# Patient Record
Sex: MALE | Race: OTHER | NOT HISPANIC OR LATINO | URBAN - METROPOLITAN AREA
[De-identification: names, ages, dates, MRNs, and addresses within clinical notes are randomized per-mention and may not be internally consistent; named-entity substitution may affect disease eponyms.]

---

## 2018-12-07 ENCOUNTER — INPATIENT (INPATIENT)
Facility: HOSPITAL | Age: 53
LOS: 3 days | Discharge: HOME | DRG: 378 | End: 2018-12-11
Attending: FAMILY MEDICINE | Admitting: FAMILY MEDICINE
Payer: MEDICAID

## 2018-12-07 VITALS
SYSTOLIC BLOOD PRESSURE: 142 MMHG | WEIGHT: 177.91 LBS | HEIGHT: 70 IN | HEART RATE: 105 BPM | DIASTOLIC BLOOD PRESSURE: 83 MMHG | TEMPERATURE: 98 F | RESPIRATION RATE: 16 BRPM | OXYGEN SATURATION: 99 %

## 2018-12-07 DIAGNOSIS — K21.9 GASTRO-ESOPHAGEAL REFLUX DISEASE WITHOUT ESOPHAGITIS: ICD-10-CM

## 2018-12-07 DIAGNOSIS — R06.02 SHORTNESS OF BREATH: ICD-10-CM

## 2018-12-07 DIAGNOSIS — K92.2 GASTROINTESTINAL HEMORRHAGE, UNSPECIFIED: ICD-10-CM

## 2018-12-07 DIAGNOSIS — D50.0 IRON DEFICIENCY ANEMIA SECONDARY TO BLOOD LOSS (CHRONIC): ICD-10-CM

## 2018-12-07 DIAGNOSIS — D64.9 ANEMIA, UNSPECIFIED: ICD-10-CM

## 2018-12-07 LAB
ABO RH CONFIRMATION: SIGNIFICANT CHANGE UP
ALBUMIN SERPL ELPH-MCNC: 3.6 G/DL — SIGNIFICANT CHANGE UP (ref 3.3–5)
ALP SERPL-CCNC: 62 U/L — SIGNIFICANT CHANGE UP (ref 40–120)
ALT FLD-CCNC: 14 U/L — SIGNIFICANT CHANGE UP (ref 12–78)
ANION GAP SERPL CALC-SCNC: 7 MMOL/L — SIGNIFICANT CHANGE UP (ref 5–17)
APTT BLD: 25.9 SEC — LOW (ref 27.5–36.3)
AST SERPL-CCNC: 11 U/L — LOW (ref 15–37)
BASOPHILS # BLD AUTO: 0.03 K/UL — SIGNIFICANT CHANGE UP (ref 0–0.2)
BASOPHILS NFR BLD AUTO: 0.5 % — SIGNIFICANT CHANGE UP (ref 0–2)
BILIRUB SERPL-MCNC: 0.3 MG/DL — SIGNIFICANT CHANGE UP (ref 0.2–1.2)
BUN SERPL-MCNC: 15 MG/DL — SIGNIFICANT CHANGE UP (ref 7–23)
CALCIUM SERPL-MCNC: 8.1 MG/DL — LOW (ref 8.5–10.1)
CHLORIDE SERPL-SCNC: 106 MMOL/L — SIGNIFICANT CHANGE UP (ref 96–108)
CO2 SERPL-SCNC: 27 MMOL/L — SIGNIFICANT CHANGE UP (ref 22–31)
CREAT SERPL-MCNC: 0.95 MG/DL — SIGNIFICANT CHANGE UP (ref 0.5–1.3)
EOSINOPHIL # BLD AUTO: 0.14 K/UL — SIGNIFICANT CHANGE UP (ref 0–0.5)
EOSINOPHIL NFR BLD AUTO: 2.5 % — SIGNIFICANT CHANGE UP (ref 0–6)
GLUCOSE SERPL-MCNC: 88 MG/DL — SIGNIFICANT CHANGE UP (ref 70–99)
HCT VFR BLD CALC: 22.2 % — LOW (ref 39–50)
HGB BLD-MCNC: 5.7 G/DL — CRITICAL LOW (ref 13–17)
IMM GRANULOCYTES NFR BLD AUTO: 0.4 % — SIGNIFICANT CHANGE UP (ref 0–1.5)
INR BLD: 1.14 RATIO — SIGNIFICANT CHANGE UP (ref 0.88–1.16)
LYMPHOCYTES # BLD AUTO: 1.41 K/UL — SIGNIFICANT CHANGE UP (ref 1–3.3)
LYMPHOCYTES # BLD AUTO: 25.5 % — SIGNIFICANT CHANGE UP (ref 13–44)
MCHC RBC-ENTMCNC: 14.1 PG — LOW (ref 27–34)
MCHC RBC-ENTMCNC: 25.7 GM/DL — LOW (ref 32–36)
MCV RBC AUTO: 55.1 FL — LOW (ref 80–100)
MONOCYTES # BLD AUTO: 0.51 K/UL — SIGNIFICANT CHANGE UP (ref 0–0.9)
MONOCYTES NFR BLD AUTO: 9.2 % — SIGNIFICANT CHANGE UP (ref 2–14)
NEUTROPHILS # BLD AUTO: 3.43 K/UL — SIGNIFICANT CHANGE UP (ref 1.8–7.4)
NEUTROPHILS NFR BLD AUTO: 61.9 % — SIGNIFICANT CHANGE UP (ref 43–77)
OB PNL STL: NEGATIVE — SIGNIFICANT CHANGE UP
PLATELET # BLD AUTO: 378 K/UL — SIGNIFICANT CHANGE UP (ref 150–400)
POTASSIUM SERPL-MCNC: 3.9 MMOL/L — SIGNIFICANT CHANGE UP (ref 3.5–5.3)
POTASSIUM SERPL-SCNC: 3.9 MMOL/L — SIGNIFICANT CHANGE UP (ref 3.5–5.3)
PROT SERPL-MCNC: 7 G/DL — SIGNIFICANT CHANGE UP (ref 6–8.3)
PROTHROM AB SERPL-ACNC: 12.9 SEC — SIGNIFICANT CHANGE UP (ref 10–12.9)
RBC # BLD: 4.03 M/UL — LOW (ref 4.2–5.8)
RBC # FLD: 21.5 % — HIGH (ref 10.3–14.5)
SODIUM SERPL-SCNC: 140 MMOL/L — SIGNIFICANT CHANGE UP (ref 135–145)
WBC # BLD: 5.54 K/UL — SIGNIFICANT CHANGE UP (ref 3.8–10.5)
WBC # FLD AUTO: 5.54 K/UL — SIGNIFICANT CHANGE UP (ref 3.8–10.5)

## 2018-12-07 PROCEDURE — 71045 X-RAY EXAM CHEST 1 VIEW: CPT | Mod: 26

## 2018-12-07 PROCEDURE — 99285 EMERGENCY DEPT VISIT HI MDM: CPT

## 2018-12-07 PROCEDURE — 93010 ELECTROCARDIOGRAM REPORT: CPT

## 2018-12-07 PROCEDURE — 99223 1ST HOSP IP/OBS HIGH 75: CPT | Mod: AI

## 2018-12-07 RX ORDER — SODIUM CHLORIDE 9 MG/ML
1000 INJECTION INTRAMUSCULAR; INTRAVENOUS; SUBCUTANEOUS
Qty: 0 | Refills: 0 | Status: DISCONTINUED | OUTPATIENT
Start: 2018-12-07 | End: 2018-12-09

## 2018-12-07 RX ORDER — OCTREOTIDE ACETATE 200 UG/ML
10 INJECTION, SOLUTION INTRAVENOUS; SUBCUTANEOUS
Qty: 500 | Refills: 0 | Status: DISCONTINUED | OUTPATIENT
Start: 2018-12-07 | End: 2018-12-10

## 2018-12-07 RX ORDER — SODIUM CHLORIDE 9 MG/ML
1000 INJECTION INTRAMUSCULAR; INTRAVENOUS; SUBCUTANEOUS ONCE
Qty: 0 | Refills: 0 | Status: COMPLETED | OUTPATIENT
Start: 2018-12-07 | End: 2018-12-07

## 2018-12-07 RX ORDER — PANTOPRAZOLE SODIUM 20 MG/1
8 TABLET, DELAYED RELEASE ORAL
Qty: 80 | Refills: 0 | Status: DISCONTINUED | OUTPATIENT
Start: 2018-12-07 | End: 2018-12-10

## 2018-12-07 RX ORDER — HYDROCORTISONE 1 %
1 OINTMENT (GRAM) TOPICAL DAILY
Qty: 0 | Refills: 0 | Status: DISCONTINUED | OUTPATIENT
Start: 2018-12-07 | End: 2018-12-11

## 2018-12-07 RX ADMIN — PANTOPRAZOLE SODIUM 10 MG/HR: 20 TABLET, DELAYED RELEASE ORAL at 18:54

## 2018-12-07 RX ADMIN — SODIUM CHLORIDE 75 MILLILITER(S): 9 INJECTION INTRAMUSCULAR; INTRAVENOUS; SUBCUTANEOUS at 20:09

## 2018-12-07 RX ADMIN — SODIUM CHLORIDE 1000 MILLILITER(S): 9 INJECTION INTRAMUSCULAR; INTRAVENOUS; SUBCUTANEOUS at 14:45

## 2018-12-07 NOTE — H&P ADULT - ASSESSMENT
54 y/o m with unremarkable pmh except for hemorrhoids p/w 1-2 month hx of having weakness and shob and buring on eating spicy food and was found ot have Hb of 5.6 with no colonoscopy and endo in past

## 2018-12-07 NOTE — ED PROVIDER NOTE - STOOL
Principal Discharge DX:	PAD (peripheral artery disease)  Goal:	Incision healing, return to normal activity  Instructions for follow-up, activity and diet:	Follow up with  in 1-2 weeks. Call the office at  to schedule your appointment. You may shower; soap and water over incision sites. Do not scrub. Pat dry when done. No tub bathing or swimming until cleared. Keep incision sites out of the sun as scars will darken. Ambulate as tolerated, but no heavy lifting (>10lbs) or strenuous exercise. You may resume diabetic diet. You should be urinating at least 3-4x per day. Call the office if you experience increasing pain, leg numbness/tingling, nausea, fever/chill.    Please start taking a baby Aspirin 81mg daily
no BRBPR

## 2018-12-07 NOTE — ED ADULT TRIAGE NOTE - CHIEF COMPLAINT QUOTE
Pt sent by PMD for anemia and low H&H, as per his MD Hct was 6.0, he reports feeling dizzy and weak Pt sent by PMD for anemia and low H&H, as per his MD Hct was 6.0, he reports feeling dizzy and weak, states last month he states he numerous episodes of rectal bleeding but did not see his PMD

## 2018-12-07 NOTE — ED PROVIDER NOTE - ATTENDING CONTRIBUTION TO CARE
pt referred by pmd for anemia with hgb <7. pt saw pmd yesterday for pearson, dizziness, generalized weakness x 1 month. pt reports rectal bleeding for most of month of october, none in november or december. no fever, ha, n/v, cp, palp, abd pain.  wd, wn, male, nad, mmm, s1s2 rrr, lungs cta, abd soft, nt, nd, ext nt, full rom

## 2018-12-07 NOTE — ED ADULT NURSE REASSESSMENT NOTE - NS ED NURSE REASSESS COMMENT FT1
pt evaluated at bedside by Dr Leong.  pt to have clears tonight, NPO after MN.  Jello and ginger ale given to pt, tolerated well.  no BM/BRBPR noted while in ED.  1 of 2 units PRBC'S in progress no adverse reactions noted.  protonix gtt in progress.  pt transported to [facility] on cardiac monitor.  family at bedside.

## 2018-12-07 NOTE — ED ADULT NURSE NOTE - OBJECTIVE STATEMENT
pt reports 1-2 months of weakness.  pt states he's had BRBPR about 1-2 months ago was giving himself suppositories to treat himself.  pt reports worsening SOB/RAJAN +pale c/o dizziness

## 2018-12-07 NOTE — ED ADULT NURSE NOTE - NSIMPLEMENTINTERV_GEN_ALL_ED
Implemented All Universal Safety Interventions:  [place] to call system. Call bell, personal items and telephone within reach. Instruct patient to call for assistance. Room bathroom lighting operational. Non-slip footwear when patient is off stretcher. Physically safe environment: no spills, clutter or unnecessary equipment. Stretcher in lowest position, wheels locked, appropriate side rails in place.

## 2018-12-07 NOTE — H&P ADULT - HISTORY OF PRESENT ILLNESS
54 y/o m with unremarkable pmh except for hemorrhoids p/w 1-2 month hx of having weakness and shob. pt went to pmd and he sent him to ed for evaluation of anemia as his hgb wa sless than 6. pt narrates that 2 months ago he noticed BRBPR and started using a suppositry to treat  hemorrhoids later a week his BRBPR resolved but he has been feeling weak. pt has had no colonoscopy done in past neither endo. narrates thath when he eats something spicy he feels a lot of burning

## 2018-12-07 NOTE — ED ADULT NURSE NOTE - CHPI ED NUR SYMPTOMS NEG
no fever/no syncope/no vomiting/no nausea/no chills/no chest pain/no congestion/no diaphoresis/no back pain

## 2018-12-07 NOTE — ED PROVIDER NOTE - OBJECTIVE STATEMENT
54 y/o male sent to ED by PCP for low h and h 6/22.7. 52 y/o male sent to ED by PCP for transfusion for low h and h 6/22.7 drawn yesterday. As per pt he saw his pcp yesterday because he felt weak and dizzy over the last 1 month. Pt states he also had blood in his stool 2 months ago which lasted for 1 month. States he has not had any blood in the stool over the last 1 month. +mild sob today. Denies any other complaints. Denies n/v, f/c, chest pain, numbness, tingling.

## 2018-12-07 NOTE — ED PROVIDER NOTE - CHPI ED SYMPTOMS NEG
no pain/no numbness/no decreased eating/drinking/no fever/no vomiting/no chills/no nausea/no tingling

## 2018-12-07 NOTE — CONSULT NOTE ADULT - SUBJECTIVE AND OBJECTIVE BOX
Chief Complaint:  Patient is a 53y old  Male who presents with a chief complaint of symptomatic anemia 52 y/o m with unremarkable pmh except for hemorrhoids p/w 1-2 month hx of having weakness and shob. pt went to pmd and he sent him to ed for evaluation of anemia as his hgb wa sless than 6. pt narrates that 2 months ago he noticed BRBPR and started using a suppositry to treat  hemorrhoids later a week his BRBPR resolved but he has been feeling weak. pt has had no colonoscopy done in past neither endo. narrates thath when he eats something spicy he feels a lot of burning     Review of Systems:  · General	negative	  · Skin/Breast	negative	  · ENMT	negative	  · Respiratory and Thorax	negative	  · Cardiovascular Comments	tachycardia	  · Gastrointestinal	negative	  · Musculoskeletal	negative	  · Neurological	negative	  · Psychiatric	negative	  · Hematology/Lymphatics	negative	  · Endocrine	negative	  never had a colonosocpy no weight loss no cghange in apetite    Allergies:  No Known Allergies      Medications:  hydrocortisone hemorrhoidal Suppository 1 Suppository(s) Rectal daily  octreotide  Infusion 10 MICROgram(s)/Hr IV Continuous <Continuous>  pantoprazole Infusion 8 mG/Hr IV Continuous <Continuous>  sodium chloride 0.9%. 1000 milliLiter(s) IV Continuous <Continuous>      PMHX/PSHX:  No pertinent past medical history      Family history:  no uc no colon cancer no cd    Social History: lives at home no etoh no cigs no ivda    ROS:     General:  No wt loss, fevers, chills, night sweats, fatigue,   Eyes:  Good vision, no reported pain  ENT:  No sore throat, pain, runny nose, dysphagia  CV:  No pain, palpitations, hypo/hypertension  Resp:  No dyspnea, cough, tachypnea, wheezing  GI:  No pain, No nausea, No vomiting, No diarrhea, No constipation, No weight loss, No fever, No pruritis, No rectal bleeding, No tarry stools, No dysphagia,  :  No pain, bleeding, incontinence, nocturia  Muscle:  No pain, weakness  Neuro:  No weakness, tingling, memory problems  Psych:  No fatigue, insomnia, mood problems, depression  Endocrine:  No polyuria, polydipsia, cold/heat intolerance  Heme:  No petechiae, ecchymosis, easy bruisability  Skin:  No rash, tattoos, scars, edema      PHYSICAL EXAM:   Vital Signs:  Vital Signs Last 24 Hrs  T(C): 36.6 (07 Dec 2018 19:44), Max: 37.2 (07 Dec 2018 17:19)  T(F): 97.9 (07 Dec 2018 19:44), Max: 99 (07 Dec 2018 17:19)  HR: 76 (07 Dec 2018 19:44) (76 - 105)  BP: 127/81 (07 Dec 2018 19:44) (107/77 - 142/83)  BP(mean): 96 (07 Dec 2018 17:19) (96 - 96)  RR: 15 (07 Dec 2018 19:44) (15 - 16)  SpO2: 100% (07 Dec 2018 19:44) (99% - 100%)  Daily Height in cm: 177.8 (07 Dec 2018 13:44)    Daily     GENERAL:  Appears stated age, well-groomed, well-nourished, no distress  HEENT:  NC/AT,  conjunctivae clear and pink, no thyromegaly, nodules, adenopathy, no JVD, sclera -anicteric  CHEST:  Full & symmetric excursion, no increased effort, breath sounds clear  HEART:  Regular rhythm, S1, S2, no murmur/rub/S3/S4, no abdominal bruit, no edema  ABDOMEN:  Soft, non-tender, non-distended, normoactive bowel sounds,  no masses ,no hepato-splenomegaly, no signs of chronic liver disease  EXTEREMITIES:  no cyanosis,clubbing or edema  SKIN:  No rash/erythema/ecchymoses/petechiae/wounds/abscess/warm/dry  NEURO:  Alert, oriented, no asterixis, no tremor, no encephalopathy    LABS:                        5.7    5.54  )-----------( 378      ( 07 Dec 2018 15:16 )             22.2     12-07    140  |  106  |  15  ----------------------------<  88  3.9   |  27  |  0.95    Ca    8.1<L>      07 Dec 2018 15:16    TPro  7.0  /  Alb  3.6  /  TBili  0.3  /  DBili  x   /  AST  11<L>  /  ALT  14  /  AlkPhos  62  12-07    LIVER FUNCTIONS - ( 07 Dec 2018 15:16 )  Alb: 3.6 g/dL / Pro: 7.0 g/dL / ALK PHOS: 62 U/L / ALT: 14 U/L / AST: 11 U/L / GGT: x           PT/INR - ( 07 Dec 2018 15:16 )   PT: 12.9 sec;   INR: 1.14 ratio         PTT - ( 07 Dec 2018 15:16 )  PTT:25.9 sec        Imaging:

## 2018-12-07 NOTE — CONSULT NOTE ADULT - PROBLEM SELECTOR RECOMMENDATION 9
daily cbc   transfuse prn   consider hematology eval  check iron studies   ppi once a day  check stool occult blood  further recommendations pending above    monitor CBC, transfuse prn,   bleeding scan to be done if he decompensates do a   CT ab/pel  hold anticoagulation  IR/CRS evaluation if decompensates   colonoscopy once optimized

## 2018-12-08 LAB
ANION GAP SERPL CALC-SCNC: 8 MMOL/L — SIGNIFICANT CHANGE UP (ref 5–17)
BUN SERPL-MCNC: 10 MG/DL — SIGNIFICANT CHANGE UP (ref 7–23)
CALCIUM SERPL-MCNC: 8.4 MG/DL — LOW (ref 8.5–10.1)
CHLORIDE SERPL-SCNC: 107 MMOL/L — SIGNIFICANT CHANGE UP (ref 96–108)
CO2 SERPL-SCNC: 26 MMOL/L — SIGNIFICANT CHANGE UP (ref 22–31)
CREAT SERPL-MCNC: 1 MG/DL — SIGNIFICANT CHANGE UP (ref 0.5–1.3)
GLUCOSE SERPL-MCNC: 109 MG/DL — HIGH (ref 70–99)
HCT VFR BLD CALC: 27.4 % — LOW (ref 39–50)
HCT VFR BLD CALC: 27.8 % — LOW (ref 39–50)
HGB BLD-MCNC: 7.5 G/DL — LOW (ref 13–17)
HGB BLD-MCNC: 7.7 G/DL — LOW (ref 13–17)
MCHC RBC-ENTMCNC: 15.8 PG — LOW (ref 27–34)
MCHC RBC-ENTMCNC: 27.4 GM/DL — LOW (ref 32–36)
MCV RBC AUTO: 57.7 FL — LOW (ref 80–100)
NRBC # BLD: 0 /100 WBCS — SIGNIFICANT CHANGE UP (ref 0–0)
PLATELET # BLD AUTO: 375 K/UL — SIGNIFICANT CHANGE UP (ref 150–400)
POTASSIUM SERPL-MCNC: 4.1 MMOL/L — SIGNIFICANT CHANGE UP (ref 3.5–5.3)
POTASSIUM SERPL-SCNC: 4.1 MMOL/L — SIGNIFICANT CHANGE UP (ref 3.5–5.3)
RBC # BLD: 4.75 M/UL — SIGNIFICANT CHANGE UP (ref 4.2–5.8)
RBC # FLD: 25.6 % — HIGH (ref 10.3–14.5)
SODIUM SERPL-SCNC: 141 MMOL/L — SIGNIFICANT CHANGE UP (ref 135–145)
WBC # BLD: 6.84 K/UL — SIGNIFICANT CHANGE UP (ref 3.8–10.5)
WBC # FLD AUTO: 6.84 K/UL — SIGNIFICANT CHANGE UP (ref 3.8–10.5)

## 2018-12-08 PROCEDURE — 99233 SBSQ HOSP IP/OBS HIGH 50: CPT

## 2018-12-08 PROCEDURE — 78278 ACUTE GI BLOOD LOSS IMAGING: CPT | Mod: 26

## 2018-12-08 RX ORDER — INFLUENZA VIRUS VACCINE 15; 15; 15; 15 UG/.5ML; UG/.5ML; UG/.5ML; UG/.5ML
0.5 SUSPENSION INTRAMUSCULAR ONCE
Qty: 0 | Refills: 0 | Status: COMPLETED | OUTPATIENT
Start: 2018-12-08 | End: 2018-12-11

## 2018-12-08 RX ADMIN — OCTREOTIDE ACETATE 2 MICROGRAM(S)/HR: 200 INJECTION, SOLUTION INTRAVENOUS; SUBCUTANEOUS at 01:15

## 2018-12-08 RX ADMIN — OCTREOTIDE ACETATE 2 MICROGRAM(S)/HR: 200 INJECTION, SOLUTION INTRAVENOUS; SUBCUTANEOUS at 06:07

## 2018-12-08 RX ADMIN — PANTOPRAZOLE SODIUM 10 MG/HR: 20 TABLET, DELAYED RELEASE ORAL at 06:07

## 2018-12-08 RX ADMIN — SODIUM CHLORIDE 75 MILLILITER(S): 9 INJECTION INTRAMUSCULAR; INTRAVENOUS; SUBCUTANEOUS at 15:54

## 2018-12-08 RX ADMIN — PANTOPRAZOLE SODIUM 10 MG/HR: 20 TABLET, DELAYED RELEASE ORAL at 15:54

## 2018-12-08 NOTE — PROGRESS NOTE ADULT - SUBJECTIVE AND OBJECTIVE BOX
Patient is a 53y old  Male who presents with a chief complaint of symptomatic anemia (08 Dec 2018 08:47)      HPI:  54 y/o m with unremarkable pmh except for hemorrhoids p/w 1-2 month hx of having weakness and shob. pt went to pmd and he sent him to ed for evaluation of anemia as his hgb wa sless than 6. pt narrates that 2 months ago he noticed BRBPR and started using a suppositry to treat  hemorrhoids later a week his BRBPR resolved but he has been feeling weak. pt has had no colonoscopy done in past neither endo. narrates thath when he eats something spicy he feels a lot of burning (07 Dec 2018 17:19)  admitted     pt was found ot have Hb of 5.6 with no colonoscopy and endo in past - pt seen and examine today -pt state no blood per rectum , no melena over night  , sob getting better , tolerating  clear fluid .       INTERVAL HPI/OVERNIGHT EVENTS:  T(C): 37.1 (12-08-18 @ 08:19), Max: 37.2 (12-07-18 @ 17:19)  HR: 80 (12-08-18 @ 08:19) (71 - 105)  BP: 148/71 (12-08-18 @ 08:19) (107/77 - 148/71)  RR: 18 (12-08-18 @ 08:19) (15 - 18)  SpO2: 98% (12-08-18 @ 08:19) (96% - 100%)  Wt(kg): --  I&O's Summary    07 Dec 2018 07:01  -  08 Dec 2018 07:00  --------------------------------------------------------  IN: 906 mL / OUT: 650 mL / NET: 256 mL        REVIEW OF SYSTEMS:  CONSTITUTIONAL: No fever, weight loss,  + fatigue  EYES: No eye pain, visual disturbances, or discharge  ENMT:   No sinus or throat pain  NECK: No pain or stiffness  RESPIRATORY: No cough, wheezing, chills or hemoptysis; No shortness of breath  CARDIOVASCULAR: No chest pain, palpitations, dizziness, or leg swelling  GASTROINTESTINAL: No abdominal or epigastric pain. No nausea, vomiting,  No diarrhea or constipation. No melena or hematochezia.  GENITOURINARY: No dysuria, frequency,   NEUROLOGICAL: No headaches, memory loss, loss of strength, numbness,   SKIN: No itching, burning, rashes, or lesions   MUSCULOSKELETAL: No joint pain or swelling; No muscle, back, or extremity pain      PHYSICAL EXAM:  GENERAL: NAD, well-groomed, weakness   HEAD:  Atraumatic, Normocephalic  EYES: EOMI, PERRLA, conjunctiva pale ,  and sclera clear   ENMT:  Moist mucous membranes, No lesions  NECK: Supple, No JVD,   NERVOUS SYSTEM:  Alert & Oriented X3, Good concentration; Motor Strength 5/5 B/L upper and lower extremities; DTRs 2+ intact and symmetric  CHEST/LUNG: Clear to percussion bilaterally; No rales, rhonchi, wheezing,   HEART: Regular rate and rhythm; No murmurs,   ABDOMEN: Soft, Nontender, Nondistended; Bowel sounds present  EXTREMITIES:  2+ Peripheral Pulses, No clubbing, cyanosis, or edema    SKIN: No rashes or lesions    MEDICATIONS  (STANDING):  hydrocortisone hemorrhoidal Suppository 1 Suppository(s) Rectal daily  influenza   Vaccine 0.5 milliLiter(s) IntraMuscular once  octreotide  Infusion 10 MICROgram(s)/Hr (2 mL/Hr) IV Continuous <Continuous>  pantoprazole Infusion 8 mG/Hr (10 mL/Hr) IV Continuous <Continuous>  sodium chloride 0.9%. 1000 milliLiter(s) (75 mL/Hr) IV Continuous <Continuous>    MEDICATIONS  (PRN):      LABS:                        7.5    6.84  )-----------( 375      ( 08 Dec 2018 06:35 )             27.4     12-08    141  |  107  |  10  ----------------------------<  109<H>  4.1   |  26  |  1.00    Ca    8.4<L>      08 Dec 2018 06:35    TPro  7.0  /  Alb  3.6  /  TBili  0.3  /  DBili  x   /  AST  11<L>  /  ALT  14  /  AlkPhos  62  12-07    PT/INR - ( 07 Dec 2018 15:16 )   PT: 12.9 sec;   INR: 1.14 ratio         PTT - ( 07 Dec 2018 15:16 )  PTT:25.9 sec    CAPILLARY BLOOD GLUCOSE                  RADIOLOGY & ADDITIONAL TESTS:    Imaging Personally Reviewed:     no new test   Advance Directives:  full code

## 2018-12-08 NOTE — CONSULT NOTE ADULT - ASSESSMENT
DX s/p Rectal bleeding and symptomatic anemia  Monitor H/H after transfusion received two units  Appears related to episode of BRB with exacerbation of hemorrhoids, but agree with further work up including upper endoscopy and colonoscopy for other sources.  Hemorrhoid treatments in office once work up complete  Will follow with you

## 2018-12-08 NOTE — PROGRESS NOTE ADULT - PROBLEM SELECTOR PLAN 1
sec  to acute  blood loss  anemia  post  2 u prbc  follow  up  HH  7.5   jack nix drip and octreotide , bleeding  scan today

## 2018-12-08 NOTE — PROGRESS NOTE ADULT - SUBJECTIVE AND OBJECTIVE BOX
Overnight events:   Patient seen and examined, offers no complaints received 2 units prbc   dneis and blood per rectum or any dark stools            Review of Systems:  · General	negative	  · Skin/Breast	negative	  · ENMT	negative	  · Respiratory and Thorax	negative	  · Cardiovascular Comments	tachycardia	  · Gastrointestinal	negative	  · Musculoskeletal	negative	  · Neurological	negative	  · Psychiatric	negative	  · Hematology/Lymphatics	negative	  · Endocrine	negative	  never had a colonosocpy no weight loss no cghange in apetite    Allergies:  No Known Allergies      Medications:  hydrocortisone hemorrhoidal Suppository 1 Suppository(s) Rectal daily  octreotide  Infusion 10 MICROgram(s)/Hr IV Continuous <Continuous>  pantoprazole Infusion 8 mG/Hr IV Continuous <Continuous>  sodium chloride 0.9%. 1000 milliLiter(s) IV Continuous <Continuous>      PMHX/PSHX:  No pertinent past medical history      Family history:  no uc no colon cancer no cd    Social History: lives at home no etoh no cigs no ivda    ROS:     General:  No wt loss, fevers, chills, night sweats, fatigue,   Eyes:  Good vision, no reported pain  ENT:  No sore throat, pain, runny nose, dysphagia  CV:  No pain, palpitations, hypo/hypertension  Resp:  No dyspnea, cough, tachypnea, wheezing  GI:  No pain, No nausea, No vomiting, No diarrhea, No constipation, No weight loss, No fever, No pruritis, No rectal bleeding, No tarry stools, No dysphagia,  :  No pain, bleeding, incontinence, nocturia  Muscle:  No pain, weakness  Neuro:  No weakness, tingling, memory problems  Psych:  No fatigue, insomnia, mood problems, depression  Endocrine:  No polyuria, polydipsia, cold/heat intolerance  Heme:  No petechiae, ecchymosis, easy bruisability  Skin:  No rash, tattoos, scars, edema      PHYSICAL EXAM:   Vital Signs Last 24 Hrs  T(C): 37.1 (08 Dec 2018 08:19), Max: 37.2 (07 Dec 2018 17:19)  T(F): 98.7 (08 Dec 2018 08:19), Max: 99 (07 Dec 2018 17:19)  HR: 80 (08 Dec 2018 08:19) (71 - 105)  BP: 148/71 (08 Dec 2018 08:19) (107/77 - 148/71)  BP(mean): 96 (07 Dec 2018 17:19) (96 - 96)  RR: 18 (08 Dec 2018 08:19) (15 - 18)  SpO2: 98% (08 Dec 2018 08:19) (96% - 100%)    GENERAL:  Appears stated age, well-groomed, well-nourished, no distress  HEENT:  NC/AT,  conjunctivae clear and pink, no thyromegaly, nodules, adenopathy, no JVD, sclera -anicteric  CHEST:  Full & symmetric excursion, no increased effort, breath sounds clear  HEART:  Regular rhythm, S1, S2, no murmur/rub/S3/S4, no abdominal bruit, no edema  ABDOMEN:  Soft, non-tender, non-distended, normoactive bowel sounds,  no masses ,no hepato-splenomegaly, no signs of chronic liver disease  EXTEREMITIES:  no cyanosis,clubbing or edema  SKIN:  No rash/erythema/ecchymoses/petechiae/wounds/abscess/warm/dry  NEURO:  Alert, oriented, no asterixis, no tremor, no encephalopathy    LABS:                                          7.5    6.84  )-----------( 375      ( 08 Dec 2018 06:35 )             27.4   12-08    141  |  107  |  10  ----------------------------<  109<H>  4.1   |  26  |  1.00    Ca    8.4<L>      08 Dec 2018 06:35    TPro  7.0  /  Alb  3.6  /  TBili  0.3  /  DBili  x   /  AST  11<L>  /  ALT  14  /  AlkPhos  62  12-07      PT/INR - ( 07 Dec 2018 15:16 )   PT: 12.9 sec;   INR: 1.14 ratio         PTT - ( 07 Dec 2018 15:16 )  PTT:25.9 sec        Imaging:

## 2018-12-08 NOTE — CONSULT NOTE ADULT - SUBJECTIVE AND OBJECTIVE BOX
Chief Complaint:  Rectal bleeding and anemia  HPI: 52 y/o m with unremarkable pmh except for hemorrhoids.  Patient in late September early october 4 weeks of BRB with every bm prolpase and pain.  Patient used OTC suppositories and the bleeding resolved.  Subsequently over the last two months he has been having weakness and sob. He was unable tolerate climbing stairs. Patient seen by pmd yesterday for evaluation and PMD sent him to ed for evaluation of anemia as his hgb was less than 6. Patient denies straining or diarrhea with regular two bm per day with no change.  . PAtient has had no colonoscopy done in past neither endo. He descibed when he eats something spicy he feels a lot of burning.  He also stated that a large amount of spicey food brought on exacerbation of hemorrhoids and bleeding.    never had a colonoscopy no weight loss no change in apatite no change in stool    Allergies:  No Known Allergies      Medications:  hydrocortisone hemorrhoidal Suppository 1 Suppository(s) Rectal daily  octreotide  Infusion 10 MICROgram(s)/Hr IV Continuous <Continuous>  pantoprazole Infusion 8 mG/Hr IV Continuous <Continuous>  sodium chloride 0.9%. 1000 milliLiter(s) IV Continuous <Continuous>      PMHX/PSHX:  No pertinent past medical history      Family history:  no uc no colon cancer no cd    Social History: lives at home no etoh no cigs no ivda    ROS:     General:  No wt loss, fevers, chills, night sweats, fatigue, weakness malaise and SOB   Eyes:  Good vision, no reported pain  ENT:  No sore throat, pain, runny nose, dysphagia  CV:  No pain, palpitations, hypo/hypertension  Resp:  No dyspnea, cough, tachypnea, wheezing  GI:  No pain, No nausea, No vomiting, No diarrhea, No constipation, No weight loss, No fever, No pruritis, Rectal bleeding, No tarry stools, No dysphagia, rectal pain  :  No pain, bleeding, incontinence, nocturia  Muscle:  No pain, weakness  Neuro:  No weakness, tingling, memory problems  Psych:  No fatigue, insomnia, mood problems, depression  Endocrine:  No polyuria, polydipsia, cold/heat intolerance  Heme:  No petechiae, ecchymosis, easy bruisability  Skin:  No rash, tattoos, scars, edema      PHYSICAL EXAM:   Vital Signs:  Vital Signs Last 24 Hrs  T(C): 36.6 (07 Dec 2018 19:44), Max: 37.2 (07 Dec 2018 17:19)  T(F): 97.9 (07 Dec 2018 19:44), Max: 99 (07 Dec 2018 17:19)  HR: 76 (07 Dec 2018 19:44) (76 - 105)  BP: 127/81 (07 Dec 2018 19:44) (107/77 - 142/83)  BP(mean): 96 (07 Dec 2018 17:19) (96 - 96)  RR: 15 (07 Dec 2018 19:44) (15 - 16)  SpO2: 100% (07 Dec 2018 19:44) (99% - 100%)  Daily Height in cm: 177.8 (07 Dec 2018 13:44)    Daily     GENERAL:  Appears stated age, well-groomed, well-nourished, no distress  HEENT:  NC/AT,  conjunctivae clear and pink, no thyromegaly, nodules, adenopathy, no JVD, sclera -anicteric  CHEST:  Full & symmetric excursion, no increased effort, breath sounds clear  HEART:  Regular rhythm, S1, S2, no murmur/rub/S3/S4, no abdominal bruit, no edema  ABDOMEN:  Soft, non-tender, non-distended, normoactive bowel sounds,  no masses ,no hepato-splenomegaly, no signs of chronic liver disease  TITUS no blood present, palpable hemorrhoids  EXTEREMITIES:  no cyanosis,clubbing or edema  SKIN:  No rash/erythema/ecchymoses/petechiae/wounds/abscess/warm/dry  NEURO:  Alert, oriented, no asterixis, no tremor, no encephalopathy    LABS:                        5.7    5.54  )-----------( 378      ( 07 Dec 2018 15:16 )             22.2     12-07    140  |  106  |  15  ----------------------------<  88  3.9   |  27  |  0.95    Ca    8.1<L>      07 Dec 2018 15:16    TPro  7.0  /  Alb  3.6  /  TBili  0.3  /  DBili  x   /  AST  11<L>  /  ALT  14  /  AlkPhos  62  12-07    LIVER FUNCTIONS - ( 07 Dec 2018 15:16 )  Alb: 3.6 g/dL / Pro: 7.0 g/dL / ALK PHOS: 62 U/L / ALT: 14 U/L / AST: 11 U/L / GGT: x           PT/INR - ( 07 Dec 2018 15:16 )   PT: 12.9 sec;   INR: 1.14 ratio         PTT - ( 07 Dec 2018 15:16 )  PTT:25.9 sec

## 2018-12-08 NOTE — PROGRESS NOTE ADULT - PROBLEM SELECTOR PLAN 1
monitor cbc   transfuse as needed   continue ppi   diet as tolerated   for egd/colon on [place]   bowel prep tomorrow

## 2018-12-08 NOTE — PROGRESS NOTE ADULT - SUBJECTIVE AND OBJECTIVE BOX
Chief Complaint:  Rectal bleeding and anemia  HPI: 52 y/o m with unremarkable pmh except for hemorrhoids.  Patient in late September early october 4 weeks of BRB with every bm prolpase and pain.  Patient used OTC suppositories and the bleeding resolved.  Subsequently over the last two months he has been having weakness and sob. He was unable tolerate climbing stairs. Patient seen by pmd yesterday for evaluation and PMD sent him to ed for evaluation of anemia as his hgb was less than 6. Patient denies straining or diarrhea with regular two bm per day with no change.  . PAtient has had no colonoscopy done in past neither endo. He descibed when he eats something spicy he feels a lot of burning.  He also stated that a large amount of spicey food brought on exacerbation of hemorrhoids and bleeding.    never had a colonoscopy no weight loss no change in apatite no change in stool    Allergies:  No Known Allergies      Medications:  hydrocortisone hemorrhoidal Suppository 1 Suppository(s) Rectal daily  octreotide  Infusion 10 MICROgram(s)/Hr IV Continuous <Continuous>  pantoprazole Infusion 8 mG/Hr IV Continuous <Continuous>  sodium chloride 0.9%. 1000 milliLiter(s) IV Continuous <Continuous>      PMHX/PSHX:  No pertinent past medical history      Family history:  no uc no colon cancer no cd    Social History: lives at home no etoh no cigs no ivda    ROS:     General:  No wt loss, fevers, chills, night sweats, fatigue, weakness malaise and SOB   Eyes:  Good vision, no reported pain  ENT:  No sore throat, pain, runny nose, dysphagia  CV:  No pain, palpitations, hypo/hypertension  Resp:  No dyspnea, cough, tachypnea, wheezing  GI:  No pain, No nausea, No vomiting, No diarrhea, No constipation, No weight loss, No fever, No pruritis, Rectal bleeding, No tarry stools, No dysphagia, rectal pain  :  No pain, bleeding, incontinence, nocturia  Muscle:  No pain, weakness  Neuro:  No weakness, tingling, memory problems  Psych:  No fatigue, insomnia, mood problems, depression  Endocrine:  No polyuria, polydipsia, cold/heat intolerance  Heme:  No petechiae, ecchymosis, easy bruisability  Skin:  No rash, tattoos, scars, edema      PHYSICAL EXAM:   Vital Signs:  Vital Signs Last 24 Hrs  T(C): 36.6 (07 Dec 2018 19:44), Max: 37.2 (07 Dec 2018 17:19)  T(F): 97.9 (07 Dec 2018 19:44), Max: 99 (07 Dec 2018 17:19)  HR: 76 (07 Dec 2018 19:44) (76 - 105)  BP: 127/81 (07 Dec 2018 19:44) (107/77 - 142/83)  BP(mean): 96 (07 Dec 2018 17:19) (96 - 96)  RR: 15 (07 Dec 2018 19:44) (15 - 16)  SpO2: 100% (07 Dec 2018 19:44) (99% - 100%)  Daily Height in cm: 177.8 (07 Dec 2018 13:44)    Daily     GENERAL:  Appears stated age, well-groomed, well-nourished, no distress  HEENT:  NC/AT,  conjunctivae clear and pink, no thyromegaly, nodules, adenopathy, no JVD, sclera -anicteric  CHEST:  Full & symmetric excursion, no increased effort, breath sounds clear  HEART:  Regular rhythm, S1, S2, no murmur/rub/S3/S4, no abdominal bruit, no edema  ABDOMEN:  Soft, non-tender, non-distended, normoactive bowel sounds,  no masses ,no hepato-splenomegaly, no signs of chronic liver disease  TITUS no blood present  EXTEREMITIES:  no cyanosis,clubbing or edema  SKIN:  No rash/erythema/ecchymoses/petechiae/wounds/abscess/warm/dry  NEURO:  Alert, oriented, no asterixis, no tremor, no encephalopathy    LABS:                        5.7    5.54  )-----------( 378      ( 07 Dec 2018 15:16 )             22.2     12-07    140  |  106  |  15  ----------------------------<  88  3.9   |  27  |  0.95    Ca    8.1<L>      07 Dec 2018 15:16    TPro  7.0  /  Alb  3.6  /  TBili  0.3  /  DBili  x   /  AST  11<L>  /  ALT  14  /  AlkPhos  62  12-07    LIVER FUNCTIONS - ( 07 Dec 2018 15:16 )  Alb: 3.6 g/dL / Pro: 7.0 g/dL / ALK PHOS: 62 U/L / ALT: 14 U/L / AST: 11 U/L / GGT: x           PT/INR - ( 07 Dec 2018 15:16 )   PT: 12.9 sec;   INR: 1.14 ratio         PTT - ( 07 Dec 2018 15:16 )  PTT:25.9 sec

## 2018-12-09 LAB
ANION GAP SERPL CALC-SCNC: 7 MMOL/L — SIGNIFICANT CHANGE UP (ref 5–17)
BUN SERPL-MCNC: 11 MG/DL — SIGNIFICANT CHANGE UP (ref 7–23)
CALCIUM SERPL-MCNC: 7.9 MG/DL — LOW (ref 8.5–10.1)
CHLORIDE SERPL-SCNC: 107 MMOL/L — SIGNIFICANT CHANGE UP (ref 96–108)
CO2 SERPL-SCNC: 28 MMOL/L — SIGNIFICANT CHANGE UP (ref 22–31)
CREAT SERPL-MCNC: 1 MG/DL — SIGNIFICANT CHANGE UP (ref 0.5–1.3)
GLUCOSE SERPL-MCNC: 95 MG/DL — SIGNIFICANT CHANGE UP (ref 70–99)
HCT VFR BLD CALC: 28.3 % — LOW (ref 39–50)
HGB BLD-MCNC: 7.8 G/DL — LOW (ref 13–17)
MCHC RBC-ENTMCNC: 16.2 PG — LOW (ref 27–34)
MCHC RBC-ENTMCNC: 27.6 GM/DL — LOW (ref 32–36)
MCV RBC AUTO: 58.8 FL — LOW (ref 80–100)
NRBC # BLD: 0 /100 WBCS — SIGNIFICANT CHANGE UP (ref 0–0)
PLATELET # BLD AUTO: 379 K/UL — SIGNIFICANT CHANGE UP (ref 150–400)
POTASSIUM SERPL-MCNC: 3.6 MMOL/L — SIGNIFICANT CHANGE UP (ref 3.5–5.3)
POTASSIUM SERPL-SCNC: 3.6 MMOL/L — SIGNIFICANT CHANGE UP (ref 3.5–5.3)
RBC # BLD: 4.81 M/UL — SIGNIFICANT CHANGE UP (ref 4.2–5.8)
RBC # FLD: 25.9 % — HIGH (ref 10.3–14.5)
SODIUM SERPL-SCNC: 142 MMOL/L — SIGNIFICANT CHANGE UP (ref 135–145)
WBC # BLD: 6.68 K/UL — SIGNIFICANT CHANGE UP (ref 3.8–10.5)
WBC # FLD AUTO: 6.68 K/UL — SIGNIFICANT CHANGE UP (ref 3.8–10.5)

## 2018-12-09 PROCEDURE — 99233 SBSQ HOSP IP/OBS HIGH 50: CPT

## 2018-12-09 RX ORDER — SOD SULF/SODIUM/NAHCO3/KCL/PEG
4000 SOLUTION, RECONSTITUTED, ORAL ORAL ONCE
Qty: 0 | Refills: 0 | Status: COMPLETED | OUTPATIENT
Start: 2018-12-09 | End: 2018-12-09

## 2018-12-09 RX ADMIN — OCTREOTIDE ACETATE 2 MICROGRAM(S)/HR: 200 INJECTION, SOLUTION INTRAVENOUS; SUBCUTANEOUS at 12:13

## 2018-12-09 RX ADMIN — PANTOPRAZOLE SODIUM 10 MG/HR: 20 TABLET, DELAYED RELEASE ORAL at 15:55

## 2018-12-09 RX ADMIN — SODIUM CHLORIDE 75 MILLILITER(S): 9 INJECTION INTRAMUSCULAR; INTRAVENOUS; SUBCUTANEOUS at 05:50

## 2018-12-09 RX ADMIN — Medication 4000 MILLILITER(S): at 16:03

## 2018-12-09 RX ADMIN — PANTOPRAZOLE SODIUM 10 MG/HR: 20 TABLET, DELAYED RELEASE ORAL at 05:50

## 2018-12-09 NOTE — PROGRESS NOTE ADULT - SUBJECTIVE AND OBJECTIVE BOX
Overnight events:   Patient seen and examined, offers no complaints, tolerating diet   denies any blood per rectum or any dark stools            Review of Systems:  · General	negative	  · Skin/Breast	negative	  · ENMT	negative	  · Respiratory and Thorax	negative	  · Cardiovascular Comments	tachycardia	  · Gastrointestinal	negative	  · Musculoskeletal	negative	  · Neurological	negative	  · Psychiatric	negative	  · Hematology/Lymphatics	negative	  · Endocrine	negative	  never had a colonosocpy no weight loss no cghange in apetite    Allergies:  No Known Allergies      Medications:  hydrocortisone hemorrhoidal Suppository 1 Suppository(s) Rectal daily  octreotide  Infusion 10 MICROgram(s)/Hr IV Continuous <Continuous>  pantoprazole Infusion 8 mG/Hr IV Continuous <Continuous>  sodium chloride 0.9%. 1000 milliLiter(s) IV Continuous <Continuous>      PMHX/PSHX:  No pertinent past medical history      Family history:  no uc no colon cancer no cd    Social History: lives at home no etoh no cigs no ivda    ROS:     General:  No wt loss, fevers, chills, night sweats, fatigue,   Eyes:  Good vision, no reported pain  ENT:  No sore throat, pain, runny nose, dysphagia  CV:  No pain, palpitations, hypo/hypertension  Resp:  No dyspnea, cough, tachypnea, wheezing  GI:  No pain, No nausea, No vomiting, No diarrhea, No constipation, No weight loss, No fever, No pruritis, No rectal bleeding, No tarry stools, No dysphagia,  :  No pain, bleeding, incontinence, nocturia  Muscle:  No pain, weakness  Neuro:  No weakness, tingling, memory problems  Psych:  No fatigue, insomnia, mood problems, depression  Endocrine:  No polyuria, polydipsia, cold/heat intolerance  Heme:  No petechiae, ecchymosis, easy bruisability  Skin:  No rash, tattoos, scars, edema      PHYSICAL EXAM:   Vital Signs Last 24 Hrs  T(C): 36.6 (09 Dec 2018 09:12), Max: 37.3 (08 Dec 2018 23:49)  T(F): 97.9 (09 Dec 2018 09:12), Max: 99.1 (08 Dec 2018 23:49)  HR: 82 (09 Dec 2018 09:12) (77 - 86)  BP: 121/78 (09 Dec 2018 09:12) (112/72 - 125/83)  BP(mean): --  RR: 18 (09 Dec 2018 09:12) (17 - 18)  SpO2: 96% (09 Dec 2018 09:12) (96% - 98%)    GENERAL:  Appears stated age, well-groomed, well-nourished, no distress  HEENT:  NC/AT,  conjunctivae clear and pink, no thyromegaly, nodules, adenopathy, no JVD, sclera -anicteric  CHEST:  Full & symmetric excursion, no increased effort, breath sounds clear  HEART:  Regular rhythm, S1, S2, no murmur/rub/S3/S4, no abdominal bruit, no edema  ABDOMEN:  Soft, non-tender, non-distended, normoactive bowel sounds,  no masses ,no hepato-splenomegaly, no signs of chronic liver disease  EXTEREMITIES:  no cyanosis,clubbing or edema  SKIN:  No rash/erythema/ecchymoses/petechiae/wounds/abscess/warm/dry  NEURO:  Alert, oriented, no asterixis, no tremor, no encephalopathy    LABS:                                                            7.8    6.68  )-----------( 379      ( 09 Dec 2018 07:17 )             28.3   12-09    142  |  107  |  11  ----------------------------<  95  3.6   |  28  |  1.00    Ca    7.9<L>      09 Dec 2018 07:17    TPro  7.0  /  Alb  3.6  /  TBili  0.3  /  DBili  x   /  AST  11<L>  /  ALT  14  /  AlkPhos  62  12-07        PT/INR - ( 07 Dec 2018 15:16 )   PT: 12.9 sec;   INR: 1.14 ratio         PTT - ( 07 Dec 2018 15:16 )  PTT:25.9 sec        Imaging:

## 2018-12-09 NOTE — PROGRESS NOTE ADULT - PROBLEM SELECTOR PLAN 1
sec  to acute  blood loss  anemia  post  2 u prbc  follow  up  HH  7.8  jack nix drip and octreotide , bleeding  scan  neg.

## 2018-12-09 NOTE — PROGRESS NOTE ADULT - SUBJECTIVE AND OBJECTIVE BOX
Patient is a 53y old  Male who presents with a chief complaint of symptomatic anemia (09 Dec 2018 11:01)      HPI:  52 y/o m with unremarkable pmh except for hemorrhoids p/w 1-2 month hx of having weakness and shob. pt went to pmd and he sent him to ed for evaluation of anemia as his hgb wa sless than 6. pt narrates that 2 months ago he noticed BRBPR and started using a suppositry to treat  hemorrhoids later a week his BRBPR resolved but he has been feeling weak. pt has had no colonoscopy done in past neither endo. narrates thath when he eats something spicy he feels a lot of burning (07 Dec 2018 17:19)  admitted     pt was found ot have Hb of 5.6  acute  symptomatic anemia  with no colonoscopy and endo in past - pt seen and examine today -pt state  still  no blood per rectum , no melena   , sob getting better , tolerating  diet  , npo midnight  .       INTERVAL HPI/OVERNIGHT EVENTS:  T(C): 37 (12-09-18 @ 11:45), Max: 37.3 (12-08-18 @ 23:49)  HR: 78 (12-09-18 @ 11:45) (77 - 86)  BP: 115/75 (12-09-18 @ 11:45) (112/72 - 125/83)  RR: 18 (12-09-18 @ 11:45) (17 - 18)  SpO2: 96% (12-09-18 @ 11:45) (96% - 98%)  Wt(kg): --  I&O's Summary    08 Dec 2018 07:01  -  09 Dec 2018 07:00  --------------------------------------------------------  IN: 525 mL / OUT: 2400 mL / NET: -1875 mL    09 Dec 2018 07:01  -  09 Dec 2018 15:16  --------------------------------------------------------  IN: 0 mL / OUT: 550 mL / NET: -550 mL        REVIEW OF SYSTEMS:  CONSTITUTIONAL: No fever, weight loss,  no fatigue  EYES: No eye pain, visual disturbances, or discharge  ENMT:   No sinus or throat pain  NECK: No pain or stiffness  RESPIRATORY: No cough, wheezing, chills or hemoptysis; No shortness of breath  CARDIOVASCULAR: No chest pain, palpitations, dizziness, or leg swelling  GASTROINTESTINAL: No abdominal or epigastric pain. No nausea, vomiting,  No diarrhea or constipation. No melena or hematochezia.  GENITOURINARY: No dysuria, frequency,   NEUROLOGICAL: No headaches, memory loss, loss of strength, numbness,   SKIN: No itching, burning, rashes, or lesions   MUSCULOSKELETAL: No joint pain or swelling; No muscle, back, or extremity pain      PHYSICAL EXAM:  GENERAL: NAD, well-groomed, no weakness   HEAD:  Atraumatic, Normocephalic  EYES: EOMI, PERRLA, conjunctiva pale ,  and sclera clear   ENMT:  Moist mucous membranes, No lesions  NECK: Supple, No JVD,   NERVOUS SYSTEM:  Alert & Oriented X3, Good concentration; Motor Strength 5/5 B/L upper and lower extremities; DTRs 2+ intact and symmetric  CHEST/LUNG: Clear to percussion bilaterally; No rales, rhonchi, wheezing,   HEART: Regular rate and rhythm; No murmurs,   ABDOMEN: Soft, Nontender, Nondistended; Bowel sounds present  EXTREMITIES:  2+ Peripheral Pulses, No clubbing, cyanosis, or edema    SKIN: No rashes or lesions        MEDICATIONS  (STANDING):  hydrocortisone hemorrhoidal Suppository 1 Suppository(s) Rectal daily  influenza   Vaccine 0.5 milliLiter(s) IntraMuscular once  octreotide  Infusion 10 MICROgram(s)/Hr (2 mL/Hr) IV Continuous <Continuous>  pantoprazole Infusion 8 mG/Hr (10 mL/Hr) IV Continuous <Continuous>  polyethylene glycol/electrolyte Solution. 4000 milliLiter(s) Oral once  sodium chloride 0.9%. 1000 milliLiter(s) (75 mL/Hr) IV Continuous <Continuous>    MEDICATIONS  (PRN):      LABS:                        7.8    6.68  )-----------( 379      ( 09 Dec 2018 07:17 )             28.3     12-09    142  |  107  |  11  ----------------------------<  95  3.6   |  28  |  1.00    Ca    7.9<L>      09 Dec 2018 07:17                      RADIOLOGY & ADDITIONAL TESTS:    Imaging Personally Reviewed:     no new test   Advance Directives:    full code

## 2018-12-09 NOTE — PROGRESS NOTE ADULT - SUBJECTIVE AND OBJECTIVE BOX
53 year old male presented with a history of BRB per rectum.  Having had a large amount of blood for several weeks about two months ago.  He presented with SOB due to symptomatic anemia.  He feels much better with transfusion of 2 units of PRBC.  He had no further blood per rectum yesterday or overnight though he has not had a bm.

## 2018-12-09 NOTE — PROGRESS NOTE ADULT - PROBLEM SELECTOR PLAN 1
monitor cbc   transfuse as needed   continue ppi   clear diet   Npo after midnight   for egd/colon on moday   bowel prep

## 2018-12-10 DIAGNOSIS — Z29.9 ENCOUNTER FOR PROPHYLACTIC MEASURES, UNSPECIFIED: ICD-10-CM

## 2018-12-10 LAB
ANION GAP SERPL CALC-SCNC: 7 MMOL/L — SIGNIFICANT CHANGE UP (ref 5–17)
BUN SERPL-MCNC: 14 MG/DL — SIGNIFICANT CHANGE UP (ref 7–23)
CALCIUM SERPL-MCNC: 7.9 MG/DL — LOW (ref 8.5–10.1)
CHLORIDE SERPL-SCNC: 108 MMOL/L — SIGNIFICANT CHANGE UP (ref 96–108)
CO2 SERPL-SCNC: 29 MMOL/L — SIGNIFICANT CHANGE UP (ref 22–31)
CREAT SERPL-MCNC: 1 MG/DL — SIGNIFICANT CHANGE UP (ref 0.5–1.3)
GLUCOSE SERPL-MCNC: 98 MG/DL — SIGNIFICANT CHANGE UP (ref 70–99)
HCT VFR BLD CALC: 27.1 % — LOW (ref 39–50)
HCT VFR BLD CALC: 27.3 % — LOW (ref 39–50)
HGB BLD-MCNC: 7.4 G/DL — LOW (ref 13–17)
HGB BLD-MCNC: 7.4 G/DL — LOW (ref 13–17)
MCHC RBC-ENTMCNC: 16 PG — LOW (ref 27–34)
MCHC RBC-ENTMCNC: 16.2 PG — LOW (ref 27–34)
MCHC RBC-ENTMCNC: 27.1 GM/DL — LOW (ref 32–36)
MCHC RBC-ENTMCNC: 27.3 GM/DL — LOW (ref 32–36)
MCV RBC AUTO: 59 FL — LOW (ref 80–100)
MCV RBC AUTO: 59.2 FL — LOW (ref 80–100)
NRBC # BLD: 0 /100 WBCS — SIGNIFICANT CHANGE UP (ref 0–0)
NRBC # BLD: 0 /100 WBCS — SIGNIFICANT CHANGE UP (ref 0–0)
PLATELET # BLD AUTO: 366 K/UL — SIGNIFICANT CHANGE UP (ref 150–400)
PLATELET # BLD AUTO: 419 K/UL — HIGH (ref 150–400)
POTASSIUM SERPL-MCNC: 3.6 MMOL/L — SIGNIFICANT CHANGE UP (ref 3.5–5.3)
POTASSIUM SERPL-SCNC: 3.6 MMOL/L — SIGNIFICANT CHANGE UP (ref 3.5–5.3)
RBC # BLD: 4.58 M/UL — SIGNIFICANT CHANGE UP (ref 4.2–5.8)
RBC # BLD: 4.63 M/UL — SIGNIFICANT CHANGE UP (ref 4.2–5.8)
RBC # FLD: 26.5 % — HIGH (ref 10.3–14.5)
RBC # FLD: 26.6 % — HIGH (ref 10.3–14.5)
SODIUM SERPL-SCNC: 144 MMOL/L — SIGNIFICANT CHANGE UP (ref 135–145)
WBC # BLD: 6.82 K/UL — SIGNIFICANT CHANGE UP (ref 3.8–10.5)
WBC # BLD: 6.85 K/UL — SIGNIFICANT CHANGE UP (ref 3.8–10.5)
WBC # FLD AUTO: 6.82 K/UL — SIGNIFICANT CHANGE UP (ref 3.8–10.5)
WBC # FLD AUTO: 6.85 K/UL — SIGNIFICANT CHANGE UP (ref 3.8–10.5)

## 2018-12-10 PROCEDURE — 88312 SPECIAL STAINS GROUP 1: CPT | Mod: 26

## 2018-12-10 PROCEDURE — 99233 SBSQ HOSP IP/OBS HIGH 50: CPT | Mod: GC

## 2018-12-10 PROCEDURE — 88313 SPECIAL STAINS GROUP 2: CPT | Mod: 26

## 2018-12-10 PROCEDURE — 88305 TISSUE EXAM BY PATHOLOGIST: CPT | Mod: 26

## 2018-12-10 RX ORDER — SUCRALFATE 1 G
1 TABLET ORAL EVERY 6 HOURS
Qty: 0 | Refills: 0 | Status: DISCONTINUED | OUTPATIENT
Start: 2018-12-10 | End: 2018-12-11

## 2018-12-10 RX ORDER — PANTOPRAZOLE SODIUM 20 MG/1
40 TABLET, DELAYED RELEASE ORAL
Qty: 0 | Refills: 0 | Status: DISCONTINUED | OUTPATIENT
Start: 2018-12-10 | End: 2018-12-11

## 2018-12-10 RX ADMIN — Medication 1 GRAM(S): at 17:42

## 2018-12-10 RX ADMIN — PANTOPRAZOLE SODIUM 10 MG/HR: 20 TABLET, DELAYED RELEASE ORAL at 08:48

## 2018-12-10 RX ADMIN — Medication 1 GRAM(S): at 23:19

## 2018-12-10 NOTE — PROGRESS NOTE ADULT - PROBLEM SELECTOR PLAN 2
-On IV protonix, IV octreotide  -trend H-H  -Plan for EGD and endoscopy today stable  -On IV protonix, IV octreotide  -fu  H-H in am   -Plan for EGD and endoscopy today

## 2018-12-10 NOTE — PROGRESS NOTE ADULT - PROBLEM SELECTOR PLAN 1
-2/2 to acute blood loss anemia  -s/p 2 units PRBC, Hemoglobin 7.4 today  -protonix drip and octreotide.  -bleeding scan negative. -2/2 to acute blood loss anemia  -s/p 2 units PRBC, Hemoglobin  stable   -protonix drip and octreotide.  -bleeding scan negative.

## 2018-12-10 NOTE — PROGRESS NOTE ADULT - SUBJECTIVE AND OBJECTIVE BOX
Patient is not having any complaints.  He toelrated prep with no increased bleeding.  he has no pain    ROS  GI bleeeding  All other ROs are negative    PE  ABdomern soft NT ND

## 2018-12-10 NOTE — PROGRESS NOTE ADULT - SUBJECTIVE AND OBJECTIVE BOX
Patient is a 53y old  Male who presents with a chief complaint of symptomatic anemia (09 Dec 2018 11:01)      HPI:  52 y/o m with unremarkable pmh except for hemorrhoids p/w 1-2 month hx of having weakness and shob. pt went to pmd and he sent him to ed for evaluation of anemia as his hgb wa sless than 6. pt narrates that 2 months ago he noticed BRBPR and started using a suppositry to treat  hemorrhoids later a week his BRBPR resolved but he has been feeling weak. pt has had no colonoscopy done in past neither endo. narrates thath when he eats something spicy he feels a lot of burning (07 Dec 2018 17:19)  admitted. Pt was found to have Hb of 5.6 acute symptomatic anemia with no colonoscopy and endo in past.       INTERVAL HPI/OVERNIGHT EVENTS:  Patient seen and evaluated. No events over night. Patient has no acute complaints. Denies melena, BRBPR, shortness of breath, tachycardia, weakness. He is awaiting EGD/colonoscopy.      REVIEW OF SYSTEMS:  CONSTITUTIONAL: No fever, weight loss,  no fatigue  EYES: No eye pain, visual disturbances, or discharge  ENMT: No sinus or throat pain  NECK: No pain or stiffness  RESPIRATORY: No cough, wheezing, chills or hemoptysis; No shortness of breath  CARDIOVASCULAR: No chest pain, palpitations, dizziness, or leg swelling  GASTROINTESTINAL: No abdominal or epigastric pain. No nausea, vomiting,  No diarrhea or constipation. No melena or hematochezia.  GENITOURINARY: No dysuria, frequency,   NEUROLOGICAL: No headaches, memory loss, loss of strength, numbness,   SKIN: No itching, burning, rashes, or lesions   MUSCULOSKELETAL: No joint pain or swelling; No muscle, back, or extremity pain      Vital Signs Last 24 Hrs  T(C): 36.8 (10 Dec 2018 08:38), Max: 37.1 (09 Dec 2018 16:09)  T(F): 98.2 (10 Dec 2018 08:38), Max: 98.7 (09 Dec 2018 16:09)  HR: 71 (10 Dec 2018 08:38) (71 - 94)  BP: 112/73 (10 Dec 2018 08:38) (111/72 - 131/78)  BP(mean): --  RR: 17 (10 Dec 2018 08:38) (17 - 19)  SpO2: 96% (10 Dec 2018 08:38) (93% - 98%)      I&O's Summary    09 Dec 2018 07:01  -  10 Dec 2018 07:00  --------------------------------------------------------  IN: 4000 mL / OUT: 550 mL / NET: 3450 mL    10 Dec 2018 07:01  -  10 Dec 2018 11:07  --------------------------------------------------------  IN: 48 mL / OUT: 0 mL / NET: 48 mL      PHYSICAL EXAM:  GENERAL: NAD, well-groomed, no weakness   HEAD:  Atraumatic, Normocephalic  EYES: EOMI, PERRLA, conjunctiva pale ,  and sclera clear   ENMT:  Moist mucous membranes, No lesions  NECK: Supple, No JVD,   NERVOUS SYSTEM:  Alert & Oriented X3, Good concentration; Motor Strength 5/5 B/L upper and lower extremities; DTRs 2+ intact and symmetric  CHEST/LUNG: Clear to percussion bilaterally; No rales, rhonchi, wheezing,   HEART: Regular rate and rhythm; No murmurs,   ABDOMEN: Soft, Nontender, Nondistended; Bowel sounds present  EXTREMITIES:  2+ Peripheral Pulses, No clubbing, cyanosis, or edema  SKIN: No rashes or lesions      MEDICATIONS  (STANDING):  hydrocortisone hemorrhoidal Suppository 1 Suppository(s) Rectal daily  influenza   Vaccine 0.5 milliLiter(s) IntraMuscular once  octreotide  Infusion 10 MICROgram(s)/Hr (2 mL/Hr) IV Continuous <Continuous>  pantoprazole Infusion 8 mG/Hr (10 mL/Hr) IV Continuous <Continuous>      MEDICATIONS  (PRN): None      LABS:                         7.4    6.85  )-----------( 419      ( 10 Dec 2018 06:43 )             27.1     12-10    144  |  108  |  14  ----------------------------<  98  3.6   |  29  |  1.00    Ca    7.9<L>      10 Dec 2018 06:43      RADIOLOGY, EKG & ADDITIONAL TESTS: Reviewed.     Imaging Personally Reviewed:   no new test     Advance Directives:  full code Patient is a 53y old  Male who presents with a chief complaint of symptomatic anemia (09 Dec 2018 11:01)      HPI:  52 y/o m with unremarkable pmh except for hemorrhoids p/w 1-2 month hx of having weakness and shob. pt went to pmd and he sent him to ed for evaluation of anemia as his hgb wa sless than 6. pt narrates that 2 months ago he noticed BRBPR and started using a suppositry to treat  hemorrhoids later a week his BRBPR resolved but he has been feeling weak. pt has had no colonoscopy done in past neither endo. narrates thath when he eats something spicy he feels a lot of burning (07 Dec 2018 17:19)  admitted. Pt was found to have Hb of 5.6 acute symptomatic anemia with no colonoscopy and endo in past.       INTERVAL HPI/OVERNIGHT EVENTS:  Patient seen and evaluated  today . No events over night. Patient has no acute complaints. Denies melena, BRBPR, shortness of breath,  palpitation , weakness. He is awaiting EGD/colonoscopy today .      REVIEW OF SYSTEMS:  CONSTITUTIONAL: No fever, weight loss,  no fatigue  EYES: No eye pain, visual disturbances, or discharge  ENMT: No sinus or throat pain  NECK: No pain or stiffness  RESPIRATORY: No cough, wheezing, chills or hemoptysis; No shortness of breath  CARDIOVASCULAR: No chest pain, palpitations, dizziness, or leg swelling  GASTROINTESTINAL: No abdominal or epigastric pain. No nausea, vomiting,  No diarrhea or constipation. No melena or hematochezia.  GENITOURINARY: No dysuria, frequency,   NEUROLOGICAL: No headaches, memory loss, loss of strength, numbness,   SKIN: No itching, burning, rashes, or lesions   MUSCULOSKELETAL: No joint pain or swelling; No muscle, back, or extremity pain      Vital Signs Last 24 Hrs  T(C): 36.8 (10 Dec 2018 08:38), Max: 37.1 (09 Dec 2018 16:09)  T(F): 98.2 (10 Dec 2018 08:38), Max: 98.7 (09 Dec 2018 16:09)  HR: 71 (10 Dec 2018 08:38) (71 - 94)  BP: 112/73 (10 Dec 2018 08:38) (111/72 - 131/78)  BP(mean): --  RR: 17 (10 Dec 2018 08:38) (17 - 19)  SpO2: 96% (10 Dec 2018 08:38) (93% - 98%)      I&O's Summary    09 Dec 2018 07:01  -  10 Dec 2018 07:00  --------------------------------------------------------  IN: 4000 mL / OUT: 550 mL / NET: 3450 mL    10 Dec 2018 07:01  -  10 Dec 2018 11:07  --------------------------------------------------------  IN: 48 mL / OUT: 0 mL / NET: 48 mL      PHYSICAL EXAM:  GENERAL: NAD, well-groomed, no weakness   HEAD:  Atraumatic, Normocephalic  EYES: EOMI, PERRLA, conjunctiva pale ,  and sclera clear   ENMT:  Moist mucous membranes, No lesions  NECK: Supple, No JVD,   NERVOUS SYSTEM:  Alert & Oriented X3, Good concentration; Motor Strength 5/5 B/L upper and lower extremities; DTRs 2+ intact and symmetric  CHEST/LUNG: Clear to percussion bilaterally; No rales, rhonchi, wheezing,   HEART: Regular rate and rhythm; No murmurs, no tachy   ABDOMEN: Soft, Nontender, Nondistended; Bowel sounds present  EXTREMITIES:  2+ Peripheral Pulses, No clubbing, cyanosis, or edema  SKIN: No rashes or lesions      MEDICATIONS  (STANDING):  hydrocortisone hemorrhoidal Suppository 1 Suppository(s) Rectal daily  influenza   Vaccine 0.5 milliLiter(s) IntraMuscular once  octreotide  Infusion 10 MICROgram(s)/Hr (2 mL/Hr) IV Continuous <Continuous>  pantoprazole Infusion 8 mG/Hr (10 mL/Hr) IV Continuous <Continuous>      MEDICATIONS  (PRN): None      LABS:                         7.4    6.85  )-----------( 419      ( 10 Dec 2018 06:43 )             27.1     12-10    144  |  108  |  14  ----------------------------<  98  3.6   |  29  |  1.00    Ca    7.9<L>      10 Dec 2018 06:43      RADIOLOGY, EKG & ADDITIONAL TESTS: Reviewed.     Imaging Personally Reviewed:   no new test     Advance Directives:  full code

## 2018-12-11 VITALS
RESPIRATION RATE: 16 BRPM | SYSTOLIC BLOOD PRESSURE: 106 MMHG | OXYGEN SATURATION: 96 % | HEART RATE: 76 BPM | DIASTOLIC BLOOD PRESSURE: 70 MMHG | TEMPERATURE: 98 F

## 2018-12-11 LAB
HCT VFR BLD CALC: 28.8 % — LOW (ref 39–50)
HGB BLD-MCNC: 7.8 G/DL — LOW (ref 13–17)
MCHC RBC-ENTMCNC: 16.1 PG — LOW (ref 27–34)
MCHC RBC-ENTMCNC: 27.1 GM/DL — LOW (ref 32–36)
MCV RBC AUTO: 59.6 FL — LOW (ref 80–100)
NRBC # BLD: 0 /100 WBCS — SIGNIFICANT CHANGE UP (ref 0–0)
PLATELET # BLD AUTO: 362 K/UL — SIGNIFICANT CHANGE UP (ref 150–400)
RBC # BLD: 4.83 M/UL — SIGNIFICANT CHANGE UP (ref 4.2–5.8)
RBC # FLD: 27.1 % — HIGH (ref 10.3–14.5)
WBC # BLD: 7.92 K/UL — SIGNIFICANT CHANGE UP (ref 3.8–10.5)
WBC # FLD AUTO: 7.92 K/UL — SIGNIFICANT CHANGE UP (ref 3.8–10.5)

## 2018-12-11 PROCEDURE — 99239 HOSP IP/OBS DSCHRG MGMT >30: CPT

## 2018-12-11 RX ORDER — SUCRALFATE 1 G
1 TABLET ORAL
Qty: 84 | Refills: 0 | OUTPATIENT
Start: 2018-12-11 | End: 2018-12-31

## 2018-12-11 RX ORDER — HYDROCORTISONE 1 %
1 OINTMENT (GRAM) TOPICAL
Qty: 10 | Refills: 0 | OUTPATIENT
Start: 2018-12-11 | End: 2018-12-20

## 2018-12-11 RX ORDER — PANTOPRAZOLE SODIUM 20 MG/1
1 TABLET, DELAYED RELEASE ORAL
Qty: 21 | Refills: 0 | OUTPATIENT
Start: 2018-12-11 | End: 2018-12-31

## 2018-12-11 RX ADMIN — INFLUENZA VIRUS VACCINE 0.5 MILLILITER(S): 15; 15; 15; 15 SUSPENSION INTRAMUSCULAR at 13:51

## 2018-12-11 RX ADMIN — Medication 1 GRAM(S): at 13:15

## 2018-12-11 RX ADMIN — PANTOPRAZOLE SODIUM 40 MILLIGRAM(S): 20 TABLET, DELAYED RELEASE ORAL at 05:48

## 2018-12-11 RX ADMIN — Medication 1 GRAM(S): at 05:48

## 2018-12-11 NOTE — PROGRESS NOTE ADULT - REASON FOR ADMISSION
symptomatic anemia

## 2018-12-11 NOTE — DISCHARGE NOTE ADULT - CARE PLAN
Principal Discharge DX:	Anemia  Goal:	Stabilization  Assessment and plan of treatment:	-patient with symptomatic anemia on admission  -received 2 units of blood  -Hemoglobin stable  -followup with with primary care doctor for repeat blood work  Secondary Diagnosis:	Gastric ulcer  Assessment and plan of treatment:	-endoscopy revealing gastric ulcer  -carafate 4x/day  -protonix daily  -follow up with GI within one week after discharge Principal Discharge DX:	Anemia  Goal:	symptomatic acute blood loss anemia / post prbc  Assessment and plan of treatment:	-patient with symptomatic anemia on admission  -received 2 units of blood  -Hemoglobin stable  -followup with with primary care doctor for repeat blood work  Secondary Diagnosis:	Gastric ulcer  Goal:	stable  Assessment and plan of treatment:	-endoscopy revealing gastric ulcer  -carafate 4x/day  -protonix daily  -follow up with GI within one week after discharge.avoid all NSAIDs as use have gastric ulcer.  Secondary Diagnosis:	Hemorrhoids  Goal:	stable  Assessment and plan of treatment:	fu dr gillis office  tomorrow  or Thursday , cont suppository per rectum daily.

## 2018-12-11 NOTE — PROGRESS NOTE ADULT - PROBLEM SELECTOR PLAN 1
sp egd/colon- findings of  with clean base and hemorrhoids  f/u path  rec ppi bid and carafate qhrs  rec ansuol bid  crs recs appreciated  no s/s active gib  monitor cbc , transfuse as needed   diet as tolerated  will need close outpatient gi f/u upon dc including repeat egd to ensure healing of ulcer  dc planning per primary team

## 2018-12-11 NOTE — DISCHARGE NOTE ADULT - PATIENT PORTAL LINK FT
You can access the [facility], offered by [facility], by registering with the following website: [email or web address]

## 2018-12-11 NOTE — DISCHARGE NOTE ADULT - CARE PROVIDERS DIRECT ADDRESSES
,DirectAddress_Unknown,DirectAddress_Unknown ,DirectAddress_Unknown,DirectAddress_Unknown,[email or web address]

## 2018-12-11 NOTE — DISCHARGE NOTE ADULT - NS AS ACTIVITY OBS
Bathing allowed/Walking-Indoors allowed/No Heavy lifting/straining/Stairs allowed/Showering allowed Bathing allowed/Showering allowed/Walking-Indoors allowed/Stairs allowed/Walking-Outdoors allowed

## 2018-12-11 NOTE — PROGRESS NOTE ADULT - ATTENDING COMMENTS
Advanced care planning was discussed with patient and family.  Advanced care planning forms were reviewed and discussed.  Risks, benefits and alternatives of gastroenterologic procedures were discussed in detail and all questions were answered.    30 minutes spent.
Advanced care planning was discussed with patient and family.  Advanced care planning forms were reviewed and discussed.  Risks, benefits and alternatives of gastroenterologic procedures were discussed in detail and all questions were answered.    30 minutes spent.
pt seen and examine see above plan - for acute  blood  loss anemia  sec to gi   bleed  endo and colonoscopy in am medically optimize to for mod risk  procedure with  mild risk factor this time .
pt seen and examine see above plan - fu repeat hb .
pt seen and examine see above plan -  acute  blood  loss anemia  sec to gi   bleed  endo and colonoscopy   today npo  medically optimize  for mod risk  procedure with  mild risk factor this time . fu hb in am dc octreotide drip today after procedure 48 hr .

## 2018-12-11 NOTE — DISCHARGE NOTE ADULT - PROVIDER TOKENS
TOKEN:'[id number]',TOKEN:'[id number]' TOKEN:'[id number]',FREE:[LAST:[Markus],FIRST:[Daniel],PHONE:[(   )    -],FAX:[(   )    -],ADDRESS:[Primary Care Doctor]] TOKEN:'[id number]',FREE:[LAST:[Markus],FIRST:[Daniel],PHONE:[(   )    -],FAX:[(   )    -],ADDRESS:[Primary Care Doctor]],TOKEN:'[id number]'

## 2018-12-11 NOTE — DISCHARGE NOTE ADULT - ADDITIONAL INSTRUCTIONS
-Please follow up with your primary doctor within one week.  -Please follow up with Gastroenterology outpatient (information below) within one week of discharge.  -Patient and family are responsible to set up all follow up appointments.  -Continue taking your medications as directed above.  -If symptoms persist/worsen, please call your PMD or return to the ED. -Please follow up with your primary doctor  dr russ  within one week.  -Please follow up with Gastroenterology outpatient (information below) within one week of discharges, Sagar (), Gastroenterology; Internal Medicine  [address] [place] [address]  Phone: [phone]  Fax: [phone].Follow up in office for hemorrhoid treatment tomorrow or THursday dr gillis office.   -Continue taking your medications as directed above.  -If symptoms persist/worsen, please call your PMD or return to the ED. avoid all NSAIDs as use have gastric ulcer.

## 2018-12-11 NOTE — DISCHARGE NOTE ADULT - MEDICATION SUMMARY - MEDICATIONS TO TAKE
I will START or STAY ON the medications listed below when I get home from the hospital:    sucralfate 1 g oral tablet  -- 1 tab(s) by mouth 4 times a day (before meals and at bedtime)  -- Indication: For Gastric ulcer    pantoprazole 40 mg oral delayed release tablet  -- 1 tab(s) by mouth once a day (before a meal)  -- Indication: For Gastric ulcer I will START or STAY ON the medications listed below when I get home from the hospital:    hydrocortisone 25 mg rectal suppository  -- 1 suppository(ies) rectally once a day  -- Indication: For hemmorhoids     sucralfate 1 g oral tablet  -- 1 tab(s) by mouth 4 times a day (before meals and at bedtime)  -- Indication: For Gastric ulcer    pantoprazole 40 mg oral delayed release tablet  -- 1 tab(s) by mouth once a day (before a meal)  -- Indication: For Gastric ulcer

## 2018-12-11 NOTE — PROGRESS NOTE ADULT - SUBJECTIVE AND OBJECTIVE BOX
INTERVAL HPI/OVERNIGHT EVENTS:  pt seen and examined  denies n/v/abd pain  per overnight rn no s/s active gib  sp egd/colon yesterday  afebrile overnight labs noted    MEDICATIONS  (STANDING):  hydrocortisone hemorrhoidal Suppository 1 Suppository(s) Rectal daily  influenza   Vaccine 0.5 milliLiter(s) IntraMuscular once  pantoprazole    Tablet 40 milliGRAM(s) Oral before breakfast  sucralfate suspension 1 Gram(s) Oral every 6 hours    MEDICATIONS  (PRN):      Allergies    No Known Allergies    Intolerances        Review of Systems:    General:  No wt loss, fevers, chills, night sweats, fatigue   Eyes:  Good vision, no reported pain  ENT:  No sore throat, pain, runny nose, dysphagia  CV:  No pain, palpitations, hypo/hypertension  Resp:  No dyspnea, cough, tachypnea, wheezing  GI:  No pain, No nausea, No vomiting, No diarrhea, No constipation, No weight loss, No fever, No pruritis, No rectal bleeding, No melena, No dysphagia  :  No pain, bleeding, incontinence, nocturia  Muscle:  No pain, weakness  Neuro:  No weakness, tingling, memory problems  Psych:  No fatigue, insomnia, mood problems, depression  Endocrine:  No polyuria, polydypsia, cold/heat intolerance  Heme:  No petechiae, ecchymosis, easy bruisability  Skin:  No rash, tattoos, scars, edema      Vital Signs Last 24 Hrs  T(C): 36.5 (11 Dec 2018 08:00), Max: 37.4 (10 Dec 2018 15:35)  T(F): 97.7 (11 Dec 2018 08:00), Max: 99.3 (10 Dec 2018 15:35)  HR: 73 (11 Dec 2018 08:00) (66 - 84)  BP: 102/67 (11 Dec 2018 08:00) (102/67 - 121/76)  BP(mean): --  RR: 16 (11 Dec 2018 08:00) (14 - 16)  SpO2: 96% (11 Dec 2018 08:00) (96% - 99%)    PHYSICAL EXAM:    Constitutional: NAD lying in bed  HEENT: ncat  Neck: No LAD  Respiratory: dec bs  Cardiovascular: S1 and S2, RRR  Gastrointestinal: soft nt nd  Extremities: No peripheral edema  Vascular: 2+ peripheral pulses  Neurological: Awake alert responds appropriately  Skin: No rashes      LABS:                        7.8    7.92  )-----------( 362      ( 11 Dec 2018 07:20 )             28.8     12-10    144  |  108  |  14  ----------------------------<  98  3.6   |  29  |  1.00    Ca    7.9<L>      10 Dec 2018 06:43            RADIOLOGY & ADDITIONAL TESTS:

## 2018-12-11 NOTE — DISCHARGE NOTE ADULT - PLAN OF CARE
Stabilization -patient with symptomatic anemia on admission  -received 2 units of blood  -Hemoglobin stable  -followup with with primary care doctor for repeat blood work -endoscopy revealing gastric ulcer  -carafate 4x/day  -protonix daily  -follow up with GI within one week after discharge symptomatic acute blood loss anemia / post prbc stable -endoscopy revealing gastric ulcer  -carafate 4x/day  -protonix daily  -follow up with GI within one week after discharge.avoid all NSAIDs as use have gastric ulcer. octavio gillis office  tomorrow  or Thursday , cont suppository per rectum daily.

## 2018-12-11 NOTE — PROGRESS NOTE ADULT - ASSESSMENT
52 y/o m with unremarkable pmh except for hemorrhoids p/w 1-2 month hx of having weakness and shob and buring on eating spicy food and was found ot have Hb of 5.6 acute  symptomatic anemia   1 month ago  was per rectum  bleeding daily with no colonoscopy and endo in past.
52 y/o m with unremarkable pmh except for hemorrhoids p/w 1-2 month hx of having weakness and shob and buring on eating spicy food and was found ot have Hb of 5.6 acute  symptomatic anemia  1 month ago  was per rectum  bleeding daily with no colonoscopy and endo in past.
52 y/o m with unremarkable pmh except for hemorrhoids p/w 1-2 month hx of having weakness and shob and buring on eating spicy food and was found ot have Hb of 5.6 with no colonoscopy and endo in past
DX s/p Rectal bleeding and symptomatic anemia  Monitor H/H after transfusion received two units  Appears related to episode of BRB with exacerbation of hemorrhoids, but agree with further work up including upper endoscopy and colonoscopy for other sources.  Hemorrhoid treatments in office once work up complete  Will follow with you
Dx GI bleed  Follow up in office for hemorrhoid treatment tomorrow or THursday  Hgb stable minimal; bleeding dispo as per hospitalist.
Dx GI bleeding  Hgb stable no further bleeding  Await result of colo and EGD  Hemorrhoid tretments as outpatient in office upon D/c either Thursday or wednesday this week.
Dx rectal bleeding  No further bleeding overnight. Responded appropriately to transfusion.    Colonoscopy and EGD tomorrow.  Bowel prep today as per GI.  Await results  He will still treatment for his hemorrhoids. I discussed this with him today.  He will follow up in office on Thursday to begin in office treatment after discharge.

## 2018-12-11 NOTE — PROGRESS NOTE ADULT - PROBLEM SELECTOR PROBLEM 1
Anemia due to blood loss
Shortness of breath at rest

## 2018-12-11 NOTE — DISCHARGE NOTE ADULT - CARE PROVIDER_API CALL
Sagar Leong (DO), Gastroenterology; Internal Medicine  [address] [place] [address]  Phone: [phone]  Fax: [phone]    Stephon Montes De Oca (DO), Family Medicine  [address] [place] [address]  Phone: [phone]  Fax: [phone] Sagar Leong (DO), Gastroenterology; Internal Medicine  [address] [place] [address]  Phone: [phone]  Fax: [phone]    Daniel Aparicio  Primary Care Doctor  Phone: (   )    -  Fax: (   )    - Sagar Leong (), Gastroenterology; Internal Medicine  [address] [place] [address]  Phone: [phone]  Fax: [phone]    Daniel Aparicio  Primary Care Doctor  Phone: (   )    -  Fax: (   )    -    Jayce Carias), ColonRectal Surgery; Surgery  [address] [place] [address]  Phone: [phone]  Fax: [phone]

## 2018-12-11 NOTE — PROGRESS NOTE ADULT - PROBLEM SELECTOR PROBLEM 2
Gastroesophageal reflux disease without esophagitis
GI bleed

## 2018-12-11 NOTE — PROGRESS NOTE ADULT - SUBJECTIVE AND OBJECTIVE BOX
Patient with small bit of blood overnight.  He has no other complaints.    ROS  Constiptutional anemia  GI Bleeding  ALl other ROS are negative    PE  ABdomen soft NT ND

## 2019-01-09 PROCEDURE — 82272 OCCULT BLD FECES 1-3 TESTS: CPT

## 2019-01-09 PROCEDURE — 36430 TRANSFUSION BLD/BLD COMPNT: CPT

## 2019-01-09 PROCEDURE — 71045 X-RAY EXAM CHEST 1 VIEW: CPT

## 2019-01-09 PROCEDURE — 99285 EMERGENCY DEPT VISIT HI MDM: CPT | Mod: 25

## 2019-01-09 PROCEDURE — 85014 HEMATOCRIT: CPT

## 2019-01-09 PROCEDURE — 80053 COMPREHEN METABOLIC PANEL: CPT

## 2019-01-09 PROCEDURE — 78278 ACUTE GI BLOOD LOSS IMAGING: CPT

## 2019-01-09 PROCEDURE — 85027 COMPLETE CBC AUTOMATED: CPT

## 2019-01-09 PROCEDURE — 88313 SPECIAL STAINS GROUP 2: CPT

## 2019-01-09 PROCEDURE — 86900 BLOOD TYPING SEROLOGIC ABO: CPT

## 2019-01-09 PROCEDURE — 80048 BASIC METABOLIC PNL TOTAL CA: CPT

## 2019-01-09 PROCEDURE — 88305 TISSUE EXAM BY PATHOLOGIST: CPT

## 2019-01-09 PROCEDURE — P9016: CPT

## 2019-01-09 PROCEDURE — 86923 COMPATIBILITY TEST ELECTRIC: CPT

## 2019-01-09 PROCEDURE — 36415 COLL VENOUS BLD VENIPUNCTURE: CPT

## 2019-01-09 PROCEDURE — 93005 ELECTROCARDIOGRAM TRACING: CPT

## 2019-01-09 PROCEDURE — 85610 PROTHROMBIN TIME: CPT

## 2019-01-09 PROCEDURE — 86850 RBC ANTIBODY SCREEN: CPT

## 2019-01-09 PROCEDURE — 86901 BLOOD TYPING SEROLOGIC RH(D): CPT

## 2019-01-09 PROCEDURE — 88312 SPECIAL STAINS GROUP 1: CPT

## 2019-01-09 PROCEDURE — 90686 IIV4 VACC NO PRSV 0.5 ML IM: CPT

## 2019-01-09 PROCEDURE — 85018 HEMOGLOBIN: CPT

## 2019-01-09 PROCEDURE — 85730 THROMBOPLASTIN TIME PARTIAL: CPT

## 2019-06-07 NOTE — ED ADULT TRIAGE NOTE - CCCP TRG CHIEF CMPLNT
[General Appearance - Alert] : alert abnormal lab result [General Appearance - In No Acute Distress] : in no acute distress [Left Foot Was Examined] : The left foot was examined [Normal] : normal [1+] : 1+ in the dorsalis pedis [Oriented To Time, Place, And Person] : oriented to person, place, and time [Swelling] : not swollen [Erythema] : not erythematous [Delayed in the Left Toes] : normal in the toes [FreeTextEntry1] : plantar incision site has healed well, no signs of infection. fibrotic patches hallux and 2nd digit removed.  Maceration in left 1st webspace. No signs of infection

## 2022-02-03 NOTE — DISCHARGE NOTE ADULT - HOSPITAL COURSE
Detail Level: Detailed The patient is a 53 year old male with a history of hemorrhoids who presents with 1-2 month history of weakness and shortness of breath. He states that he went to see his primary care doctor, who was concerned for anemia, prompting him to send the patient to the emergency department. The patient notes that 2 months ago he noticed bright red blood per rectum. At that time he began using a suppository to treat his hemorrhoids. Later that week his bleeding resolved but he began feeling weak. Prior to admission the patient had not had a colonoscopy or endoscopy. On admission he stated that he had a burning sensation when he ate spicy food. In the emergency department, the patient complained of shortness of breath and weakness, and was found to have a low hemoglobin. He was admitted to telemetry and received two unit of packed red blood cells. His weakness resolved and his hemoglobin stabilized. Colorectal surgery was consulted, and recommended out patient followup for treatment of hemorrhoids. Gastroenterology was consulted, and upper endoscopy and colonoscopy were performed. The patient was found a nonbleeding gastric ulcer. The patient was treated with Protonix and carafate.     The patient was seen and evaluated each day during his hospital course. The patient is now stable for discharge home with out patient followup.    Vital Signs Last 24 Hrs  T(C): 36.5 (11 Dec 2018 08:00), Max: 37.4 (10 Dec 2018 15:35)  T(F): 97.7 (11 Dec 2018 08:00), Max: 99.3 (10 Dec 2018 15:35)  HR: 73 (11 Dec 2018 08:00) (66 - 84)  BP: 102/67 (11 Dec 2018 08:00) (102/67 - 121/76)  BP(mean): --  RR: 16 (11 Dec 2018 08:00) (14 - 18)  SpO2: 96% (11 Dec 2018 08:00) (96% - 99%)    PHYSICAL EXAM:  GENERAL: NAD, well-groomed, no weakness   HEAD:  Atraumatic, Normocephalic  EYES: EOMI, PERRLA, conjunctiva pale ,  and sclera clear   ENMT:  Moist mucous membranes, No lesions  NECK: Supple, No JVD,   NERVOUS SYSTEM:  Alert & Oriented X3, Good concentration; Motor Strength 5/5 B/L upper and lower extremities; DTRs 2+ intact and symmetric  CHEST/LUNG: Clear to percussion bilaterally; No rales, rhonchi, wheezing,   HEART: Regular rate and rhythm; No murmurs, no tachy   ABDOMEN: Soft, Nontender, Nondistended; Bowel sounds present  EXTREMITIES:  2+ Peripheral Pulses, No clubbing, cyanosis, or edema  SKIN: No rashes or lesions The patient is a 53 year old male with a history of hemorrhoids who presents with 1-2 month history of weakness and shortness of breath. He states that he went to see his primary care doctor, who was concerned for anemia, prompting him to send the patient to the emergency department. The patient notes that 2 months ago he noticed bright red blood per rectum. At that time he began using a suppository to treat his hemorrhoids. Later that week his bleeding resolved but he began feeling weak. Prior to admission the patient had not had a colonoscopy or endoscopy. On admission he stated that he had a burning sensation when he ate spicy food. In the emergency department, the patient complained of shortness of breath and weakness, and was found to have a low hemoglobin. He was admitted to telemetry and received two unit of packed red blood cells. His weakness resolved and his hemoglobin stabilized. Colorectal surgery was consulted dr gillis and gi dr knowles , Gastroenterology was consulted, and upper endoscopy and colonoscopy were performed. The patient was found a nonbleeding gastric ulcer. The patient was treated with Protonix and carafate  octreotide drip .   GI bleed lower seen by dr gillis colo rectal for hemorrhoids as per him   Follow up in office for hemorrhoid treatment tomorrow or THursday . hosp course post procedure pt stable hb stable , no gi bleed noticed  .    acute  blood  loss anemia  sec to  upper  and lower  gi   bleed  both look like cause this time sec to    as per   endo  found gastric ulcer   and  as per colonoscopy  found hemorrhoids both causing  gi bleed .     The patient is now   medically stable for discharge home with out patient followup.    Vital Signs Last 24 Hrs  T(C): 36.5 (11 Dec 2018 08:00), Max: 37.4 (10 Dec 2018 15:35)  T(F): 97.7 (11 Dec 2018 08:00), Max: 99.3 (10 Dec 2018 15:35)  HR: 73 (11 Dec 2018 08:00) (66 - 84)  BP: 102/67 (11 Dec 2018 08:00) (102/67 - 121/76)  BP(mean): --  RR: 16 (11 Dec 2018 08:00) (14 - 18)  SpO2: 96% (11 Dec 2018 08:00) (96% - 99%)    PHYSICAL EXAM:  GENERAL: NAD, well-groomed, no weakness   HEAD:  Atraumatic, Normocephalic  EYES: EOMI, PERRLA, conjunctiva pale ,  and sclera clear   ENMT:  Moist mucous membranes, No lesions  NECK: Supple, No JVD,   NERVOUS SYSTEM:  Alert & Oriented X3, Good concentration; Motor Strength 5/5 B/L upper and lower extremities; DTRs 2+ intact and symmetric  CHEST/LUNG: Clear to percussion bilaterally; No rales, rhonchi, wheezing,   HEART: Regular rate and rhythm; No murmurs, no tachy   ABDOMEN: Soft, Nontender, Nondistended; Bowel sounds present  EXTREMITIES:  2+ Peripheral Pulses, No clubbing, cyanosis, or edema  SKIN: No rashes or lesions  fu out pt gi and pmd with in 1wk .

## 2022-07-19 NOTE — CONSULT NOTE ADULT - PROBLEM SELECTOR RECOMMENDATION 2
gerd precautions  in and out  ppi once a day  may need egd  advance diet
4 = No assist / stand by assistance

## 2024-03-04 ENCOUNTER — EMERGENCY (EMERGENCY)
Facility: HOSPITAL | Age: 59
LOS: 1 days | Discharge: HOME | End: 2024-03-04
Attending: EMERGENCY MEDICINE | Admitting: EMERGENCY MEDICINE
Payer: MEDICAID

## 2024-03-04 VITALS
OXYGEN SATURATION: 96 % | TEMPERATURE: 97 F | DIASTOLIC BLOOD PRESSURE: 90 MMHG | WEIGHT: 190.04 LBS | RESPIRATION RATE: 18 BRPM | HEART RATE: 91 BPM | SYSTOLIC BLOOD PRESSURE: 129 MMHG | HEIGHT: 70 IN

## 2024-03-04 VITALS
DIASTOLIC BLOOD PRESSURE: 87 MMHG | SYSTOLIC BLOOD PRESSURE: 130 MMHG | HEART RATE: 76 BPM | OXYGEN SATURATION: 97 % | RESPIRATION RATE: 16 BRPM

## 2024-03-04 PROCEDURE — 99283 EMERGENCY DEPT VISIT LOW MDM: CPT

## 2024-03-04 PROCEDURE — 99283 EMERGENCY DEPT VISIT LOW MDM: CPT | Mod: 25

## 2024-03-04 RX ORDER — OXYMETAZOLINE HYDROCHLORIDE 0.5 MG/ML
2 SPRAY NASAL ONCE
Refills: 0 | Status: COMPLETED | OUTPATIENT
Start: 2024-03-04 | End: 2024-03-04

## 2024-03-04 RX ADMIN — OXYMETAZOLINE HYDROCHLORIDE 2 SPRAY(S): 0.5 SPRAY NASAL at 10:18

## 2024-03-04 NOTE — ED PROVIDER NOTE - DIFFERENTIAL DIAGNOSIS
Patient presenting to the emergency room with epistaxis.  Will provide symptom control and monitor Differential Diagnosis

## 2024-03-04 NOTE — ED PROVIDER NOTE - OBJECTIVE STATEMENT
58-year-old male past medical history hyperlipidemia presents emergency department complaining of epistaxis to left naris times about 1 hour prior to arrival.  Patient states that he blew his nose, and immediately started bleeding.  Patient denies headache, dizziness, fever or chills, URI symptoms.  +daily asa 81

## 2024-03-04 NOTE — ED PROVIDER NOTE - PATIENT PORTAL LINK FT
You can access the FollowMyHealth Patient Portal offered by [facility] by registering at the following website: [email or web address]. By joining [employer]’s FollowMyHealth portal, you will also be able to view your health information using other applications (apps) compatible with our system.

## 2024-03-04 NOTE — ED PROVIDER NOTE - PROGRESS NOTE DETAILS
epistaxis resolved  pt to fu with ent  Pt was given an opportunity to have all questions answered to satisfaction.    ED return precautions discussed at length.    Pt verbalizes agreement and understanding of plan and ED return precautions.   Pt well appearing, stable for DC home.   No emergent concerns at this time.
impaired balance/pain

## 2024-03-04 NOTE — ED PROVIDER NOTE - CARE PROVIDERS DIRECT ADDRESSES
[email or web address]

## 2024-03-04 NOTE — ED PROVIDER NOTE - CLINICAL SUMMARY MEDICAL DECISION MAKING FREE TEXT BOX
Patient is a 58-year-old male who presents to the emergency room with a chief complaint of epistaxis.  Past medical history of high cholesterol.  Patient is on a statin and aspirin.  Reports approximately 1 hour prior to arrival he blew his nose and immediately began to bleed from the left nare.  He held pressure and came to the emergency room.  Currently no active bleeding.  Denies any lightheadedness nausea vomiting chest pain shortness of breath or abdominal pain.  On exam patient is sitting up in bed no acute distress normocephalic atraumatic pupils equal round and reactive no blood noted in the oropharynx.  No active bleeding from the right nare.  On the left nare there is a dilated vessel along the septum with no active bleeding currently.  Patient presenting to the emergency room with an episode of epistaxis.  Controlled after Afrin and pressure.  Will continue to observe if no further active bleeding can be discharged with ENT follow-up.  Advised not to blow his nose for the next 2 days.  Advised not to rub his nose for the next 2 days.  Advised to use humidification to prevent drying tears.

## 2024-03-04 NOTE — ED PROVIDER NOTE - NSFOLLOWUPINSTRUCTIONS_ED_ALL_ED_FT
Do not blow/rub your nose for the next 2-3 days  Afrin 1-2 sprays every 12 hours  Humidifier as directed  Follow up with ENT within 1-2 days.  Return to the ED immediately for new or worsening symptoms as we discussed.      English    Nosebleed, Adult  A nosebleed is when blood comes out of the nose. Nosebleeds are common. Usually, they are not a sign of a serious condition.    Nosebleeds can happen if a blood vessel in your nose starts to bleed or if the lining of your nose (mucous membrane) cracks. They are commonly caused by:  Allergies.  Colds.  Picking your nose.  Blowing your nose too hard.  An injury from sticking an object into your nose or getting hit in the nose.  Dry or cold air.  Less common causes of nosebleeds include:  Toxic fumes.  Something abnormal in the nose or in the air-filled spaces in the bones of the face (sinuses).  Growths in the nose, such as polyps.  Blood thinners or conditions that cause blood to clot slowly.  Certain illnesses or procedures that irritate or dry out the nasal passages.  Follow these instructions at home:  When you have a nosebleed:      Sit down and tilt your head slightly forward.  Use a clean towel or tissue to pinch your nostrils under the bony part of your nose. After 5 minutes, let go of your nose and see if bleeding starts again. Do not release pressure before that time. If there is still bleeding, repeat the pinching and holding for 5 minutes or until the bleeding stops.  Do not place tissues or gauze in the nose to stop the bleeding.  Avoid lying down and avoid tilting your head backward. That may make blood collect in the throat and cause gagging or coughing.  Use a nasal spray decongestant to help with a nosebleed as told by your health care provider.  After a nosebleed:    Avoid blowing your nose or sniffing for a number of hours.  Avoid straining, lifting, or bending at the waist for several days. You may go back to other normal activities as you are able.  If you are taking aspirin or blood thinners and you have nosebleeds, talk to your health care provider. These medicines make bleeding more likely.  Ask your health care provider if you should stop taking the medicines or if you should adjust the dose.  Do not stop taking medicines that your health care provider has recommended unless he or she tells you to stop taking them.  If your nosebleed was caused by dry mucous membranes, use over-the-counter saline nasal spray or gel and a humidifier as told by your health care provider. This will keep the mucous membranes moist and allow them to heal. If you need to use one of these products:  Choose one that is water-soluble.  Use only as much as you need and use it only as often as needed.  Do not lie down right after you use it.  If you get nosebleeds often, talk with your health care provider about medical treatments. Options may include:  Nasal cautery. This treatment stops and prevents nosebleeds by using a chemical swab or electrical device to lightly burn tiny blood vessels inside the nose.  Nasal packing. A gauze or other material is placed in the nose to keep constant pressure on the bleeding area.  Contact a health care provider if you:  Have a fever.  Get nosebleeds often or more often than usual.  Bruise very easily.  Have a nosebleed from having something stuck in your nose.  Have bleeding in your mouth.  Vomit or cough up brown material.  Have a nosebleed after you start a new medicine.  Get help right away if:  You have a nosebleed after a fall or a head injury.  Your nosebleed does not go away after 20 minutes.  You feel dizzy or weak.  You have unusual bleeding from other parts of your body.  You have unusual bruising on other parts of your body.  You become sweaty.  You vomit blood.  Summary  A nosebleed is when blood comes out of the nose. Common causes include allergies, an injury to the nose, or cold or dry air.  Initial treatment includes applying pressure for 5 minutes.  Moisturizing the nose with saline nasal spray or gel after a nosebleed may help prevent future bleeding.  Get help right away if your nosebleed does not go away after 20 minutes.  This information is not intended to replace advice given to you by your health care provider. Make sure you discuss any questions you have with your health care provider.    Document Revised: 10/15/2020 Document Reviewed: 10/15/2020  [employer] Patient Education © 2022 [employer]  [employer] logo  Terms and Conditions  Privacy Policy  Editorial Policy  All content on this site: Copyright © 2024 [employer], its licensors, and contributors. All rights are reserved, including those for text and data mining, AI training, and similar technologies. For all open access content, the Creative Commons licensing terms apply.  Cookies are used by this site. To decline or learn more, visit our Cookies page.  [employer]

## 2024-03-04 NOTE — ED PROVIDER NOTE - CARE PROVIDER_API CALL
Ken Hackett  Otolaryngology  [address], [address]  [place] [address]  Phone: [phone]  Fax: [phone]  Follow Up Time:

## 2024-03-04 NOTE — ED ADULT NURSE NOTE - OBJECTIVE STATEMENT
pt to er states his nose was bleeding this morning upon arrival no active bleeding noted denies pain denies injury is alert oriented reso even unlabored

## 2024-10-01 NOTE — PATIENT PROFILE ADULT - NSPROGENANESREACTION_GEN_A_NUR
Your covid and flu and rsv test were negative.  You have been diagnosed with a viral illness.  You may use tylenol or motrin for fevers or pain.  Use zarbees for cough.  Follow up with pediatrician in two days for reassessment.    As we discussed, I did not find a life threatening cause of your symptoms today. However, THAT DOES NOT MEAN IT COULD NOT DEVELOP. If you develop ANY new or worsening symptoms, it is critical that you return for re-evaluation. This includes any symptoms that are concerning to you, especially symptoms such as difficulty breathing, vomiting, high fever unresponsive to medication, dehydration.  If you do not return for re-evaluation, you risk serious complications, including death.     no previous reaction